# Patient Record
Sex: MALE | Race: ASIAN | NOT HISPANIC OR LATINO | Employment: UNEMPLOYED | URBAN - METROPOLITAN AREA
[De-identification: names, ages, dates, MRNs, and addresses within clinical notes are randomized per-mention and may not be internally consistent; named-entity substitution may affect disease eponyms.]

---

## 2021-04-27 ENCOUNTER — OFFICE VISIT - HEALTHEAST (OUTPATIENT)
Dept: ADDICTION MEDICINE | Facility: HOSPITAL | Age: 31
End: 2021-04-27

## 2021-04-27 DIAGNOSIS — F15.20 METHAMPHETAMINE USE DISORDER, MODERATE (H): ICD-10-CM

## 2021-04-29 ENCOUNTER — AMBULATORY - HEALTHEAST (OUTPATIENT)
Dept: ADDICTION MEDICINE | Facility: HOSPITAL | Age: 31
End: 2021-04-29

## 2021-04-29 DIAGNOSIS — F15.20 METHAMPHETAMINE USE DISORDER, SEVERE (H): ICD-10-CM

## 2021-04-30 ENCOUNTER — TRANSFERRED RECORDS (OUTPATIENT)
Dept: HEALTH INFORMATION MANAGEMENT | Facility: CLINIC | Age: 31
End: 2021-04-30

## 2021-04-30 ENCOUNTER — AMBULATORY - HEALTHEAST (OUTPATIENT)
Dept: LAB | Facility: HOSPITAL | Age: 31
End: 2021-04-30

## 2021-04-30 ENCOUNTER — AMBULATORY - HEALTHEAST (OUTPATIENT)
Dept: ADDICTION MEDICINE | Facility: HOSPITAL | Age: 31
End: 2021-04-30

## 2021-04-30 DIAGNOSIS — F15.20 METHAMPHETAMINE USE DISORDER, SEVERE (H): ICD-10-CM

## 2021-04-30 LAB
AMPHETAMINES UR QL SCN: ABNORMAL
BARBITURATES UR QL: ABNORMAL
BENZODIAZ UR QL: ABNORMAL
CANNABINOIDS UR QL SCN: ABNORMAL
COCAINE UR QL: ABNORMAL
CREAT UR-MCNC: 20.2 MG/DL
ETHANOL UR CFM-MCNC: <10 MG/DL
METHADONE UR QL SCN: ABNORMAL
OPIATES UR QL SCN: ABNORMAL
OXYCODONE UR QL: ABNORMAL
PCP UR QL SCN: ABNORMAL

## 2021-05-03 ENCOUNTER — OFFICE VISIT - HEALTHEAST (OUTPATIENT)
Dept: ADDICTION MEDICINE | Facility: HOSPITAL | Age: 31
End: 2021-05-03

## 2021-05-03 DIAGNOSIS — F15.20 METHAMPHETAMINE USE DISORDER, MODERATE (H): ICD-10-CM

## 2021-05-03 LAB
AMPHET UR-MCNC: 668 NG/ML
MDA UR-MCNC: <200 NG/ML
MDEA UR-MCNC: <200 NG/ML
MDMA UR-MCNC: <200 NG/ML
METHAMPHET UR-MCNC: 6894 NG/ML
PHENTERMINE UR CFM-MCNC: <200 NG/ML

## 2021-05-04 ENCOUNTER — OFFICE VISIT - HEALTHEAST (OUTPATIENT)
Dept: ADDICTION MEDICINE | Facility: HOSPITAL | Age: 31
End: 2021-05-04

## 2021-05-04 DIAGNOSIS — F15.20 METHAMPHETAMINE USE DISORDER, MODERATE (H): ICD-10-CM

## 2021-05-05 ENCOUNTER — COMMUNICATION - HEALTHEAST (OUTPATIENT)
Dept: ADDICTION MEDICINE | Facility: HOSPITAL | Age: 31
End: 2021-05-05

## 2021-05-06 ENCOUNTER — AMBULATORY - HEALTHEAST (OUTPATIENT)
Dept: ADDICTION MEDICINE | Facility: HOSPITAL | Age: 31
End: 2021-05-06

## 2021-05-07 ENCOUNTER — AMBULATORY - HEALTHEAST (OUTPATIENT)
Dept: ADDICTION MEDICINE | Facility: HOSPITAL | Age: 31
End: 2021-05-07

## 2021-05-11 ENCOUNTER — OFFICE VISIT - HEALTHEAST (OUTPATIENT)
Dept: ADDICTION MEDICINE | Facility: HOSPITAL | Age: 31
End: 2021-05-11

## 2021-05-11 DIAGNOSIS — F15.20 METHAMPHETAMINE USE DISORDER, MODERATE (H): ICD-10-CM

## 2021-05-13 ENCOUNTER — OFFICE VISIT - HEALTHEAST (OUTPATIENT)
Dept: ADDICTION MEDICINE | Facility: HOSPITAL | Age: 31
End: 2021-05-13

## 2021-05-13 DIAGNOSIS — F15.20 METHAMPHETAMINE USE DISORDER, MODERATE (H): ICD-10-CM

## 2021-05-14 ENCOUNTER — AMBULATORY - HEALTHEAST (OUTPATIENT)
Dept: LAB | Facility: HOSPITAL | Age: 31
End: 2021-05-14

## 2021-05-14 ENCOUNTER — AMBULATORY - HEALTHEAST (OUTPATIENT)
Dept: ADDICTION MEDICINE | Facility: HOSPITAL | Age: 31
End: 2021-05-14

## 2021-05-14 DIAGNOSIS — F15.20 METHAMPHETAMINE USE DISORDER, SEVERE (H): ICD-10-CM

## 2021-05-14 LAB
AMPHETAMINES UR QL SCN: ABNORMAL
BARBITURATES UR QL: ABNORMAL
BENZODIAZ UR QL: ABNORMAL
CANNABINOIDS UR QL SCN: ABNORMAL
COCAINE UR QL: ABNORMAL
CREAT UR-MCNC: 8.8 MG/DL
ETHANOL UR CFM-MCNC: <10 MG/DL
METHADONE UR QL SCN: ABNORMAL
OPIATES UR QL SCN: ABNORMAL
OXYCODONE UR QL: ABNORMAL
PCP UR QL SCN: ABNORMAL

## 2021-05-18 ENCOUNTER — AMBULATORY - HEALTHEAST (OUTPATIENT)
Dept: ADDICTION MEDICINE | Facility: HOSPITAL | Age: 31
End: 2021-05-18

## 2021-05-20 ENCOUNTER — OFFICE VISIT - HEALTHEAST (OUTPATIENT)
Dept: ADDICTION MEDICINE | Facility: HOSPITAL | Age: 31
End: 2021-05-20

## 2021-05-20 DIAGNOSIS — F15.20 METHAMPHETAMINE USE DISORDER, MODERATE (H): ICD-10-CM

## 2021-05-21 ENCOUNTER — AMBULATORY - HEALTHEAST (OUTPATIENT)
Dept: ADDICTION MEDICINE | Facility: HOSPITAL | Age: 31
End: 2021-05-21

## 2021-05-24 ENCOUNTER — AMBULATORY - HEALTHEAST (OUTPATIENT)
Dept: LAB | Facility: HOSPITAL | Age: 31
End: 2021-05-24

## 2021-05-24 DIAGNOSIS — F15.20 METHAMPHETAMINE USE DISORDER, SEVERE (H): ICD-10-CM

## 2021-05-24 LAB
AMPHETAMINES UR QL SCN: NORMAL
BARBITURATES UR QL: NORMAL
BENZODIAZ UR QL: NORMAL
CANNABINOIDS UR QL SCN: NORMAL
COCAINE UR QL: NORMAL
CREAT UR-MCNC: 16.3 MG/DL
ETHANOL UR CFM-MCNC: <10 MG/DL
METHADONE UR QL SCN: NORMAL
OPIATES UR QL SCN: NORMAL
OXYCODONE UR QL: NORMAL
PCP UR QL SCN: NORMAL

## 2021-05-25 ENCOUNTER — OFFICE VISIT - HEALTHEAST (OUTPATIENT)
Dept: ADDICTION MEDICINE | Facility: HOSPITAL | Age: 31
End: 2021-05-25

## 2021-05-25 DIAGNOSIS — F15.20 METHAMPHETAMINE USE DISORDER, MODERATE (H): ICD-10-CM

## 2021-05-27 ENCOUNTER — AMBULATORY - HEALTHEAST (OUTPATIENT)
Dept: ADDICTION MEDICINE | Facility: HOSPITAL | Age: 31
End: 2021-05-27

## 2021-05-28 ENCOUNTER — AMBULATORY - HEALTHEAST (OUTPATIENT)
Dept: ADDICTION MEDICINE | Facility: HOSPITAL | Age: 31
End: 2021-05-28

## 2021-06-03 ENCOUNTER — OFFICE VISIT - HEALTHEAST (OUTPATIENT)
Dept: ADDICTION MEDICINE | Facility: HOSPITAL | Age: 31
End: 2021-06-03

## 2021-06-03 DIAGNOSIS — F15.20 METHAMPHETAMINE USE DISORDER, MODERATE (H): ICD-10-CM

## 2021-06-04 ENCOUNTER — AMBULATORY - HEALTHEAST (OUTPATIENT)
Dept: ADDICTION MEDICINE | Facility: HOSPITAL | Age: 31
End: 2021-06-04

## 2021-06-08 ENCOUNTER — OFFICE VISIT - HEALTHEAST (OUTPATIENT)
Dept: ADDICTION MEDICINE | Facility: HOSPITAL | Age: 31
End: 2021-06-08

## 2021-06-08 DIAGNOSIS — F15.20 METHAMPHETAMINE USE DISORDER, MODERATE (H): ICD-10-CM

## 2021-06-10 ENCOUNTER — OFFICE VISIT - HEALTHEAST (OUTPATIENT)
Dept: ADDICTION MEDICINE | Facility: HOSPITAL | Age: 31
End: 2021-06-10

## 2021-06-10 DIAGNOSIS — F15.20 METHAMPHETAMINE USE DISORDER, MODERATE (H): ICD-10-CM

## 2021-06-11 ENCOUNTER — AMBULATORY - HEALTHEAST (OUTPATIENT)
Dept: ADDICTION MEDICINE | Facility: HOSPITAL | Age: 31
End: 2021-06-11

## 2021-06-15 ENCOUNTER — COMMUNICATION - HEALTHEAST (OUTPATIENT)
Dept: ADDICTION MEDICINE | Facility: HOSPITAL | Age: 31
End: 2021-06-15

## 2021-06-15 ENCOUNTER — OFFICE VISIT - HEALTHEAST (OUTPATIENT)
Dept: ADDICTION MEDICINE | Facility: HOSPITAL | Age: 31
End: 2021-06-15

## 2021-06-15 DIAGNOSIS — F15.20 METHAMPHETAMINE USE DISORDER, MODERATE (H): ICD-10-CM

## 2021-06-17 NOTE — PROGRESS NOTES
Telemedicine Visit: The patient's condition can be safely assessed and treated via synchronous audio and visual telemedicine encounter.      Reason for Telemedicine Visit: Services only offered via telehealth.    Originating Site (Patient Location): Patient's home.    Distant Site (Provider Location): [facility] Outpatient Setting: [facility].    Consent:  The patient/guardian has verbally consented to: the potential risks and benefits of telemedicine (video visit) versus in person care; bill my insurance or make self-payment for services provided; and responsibility for payment of non-covered services.     Mode of Communication:  Video Conference via Zoom.    Call Started at: 1:00 PM  Call Ended at: 3: 00 PM    As the provider I attest to compliance with applicable laws and regulations related to telemedicine.    Jayesh Mcconnell attended 2 hours of group today.     The group topic was  What Does a Healthy Family Look Like?  Patient was responsive to topic.     Patient's engagement in the group session: medium.    Total group size: 7    LE Patel [facility]  5/4/2021, 3:05 PM.

## 2021-06-17 NOTE — PROGRESS NOTES
ABSENT NOTE:    Jayesh Mcconnell was absent from group 5/18/2021 . This absence was not excused. This writer attempted to contact patient via phone. Patient is expected to be in group on 05/20/21.     LE Espinal, [facility]  5/18/2021 , 2:35 PM

## 2021-06-17 NOTE — PROGRESS NOTES
Weekly Progress Note 05/10/2021 - 5/14/2021  Ko, Ko  1990  [id number]      D) Pt attended 2/2 groups this week with 1 unexcused absence. Patient attended 0 individual sessions this week. A) Staff facilitated groups and reviewed tx progress. [facility] for [facility]. R) No VAP needed at this time.   Any significant events, defines as events that impact patients relationship with others inside and outside of treatment: No  Indicate any changes or monitoring of physical or mental health problems: None    Indicate involvement by any outside supports: None  IAPP reviewed and modified as needed. NA  Pt working on the following dimensions:  Dimension #1 - Withdrawal Potential - Risk 0. Client reports last date of use as 04/27/2021. No signs or symptoms of intoxication or withdrawal noted or reported.   Specific goals from treatment plan addressed this week:  Client to maintain abstinence throughout outpatient treatment.   Effectiveness of strategies:  Progressing: No Changes Noted.     Dimension #2 - Biomedical - Risk 0. The client reports he has no medical needs unmet and can obtain any necessary medical care on his own.    Specific goals from treatment plan addressed this week:  Client to maintain stable health throughout outpatient treatment.  Effectiveness of strategies:  Progressing: Client reports no new issues in this area this week.     Dimension #3 - Emotional/Behavioral/Cognitive - Risk 0 No emotional or mental health problems noted or reported.  Specific goals from treatment plan addressed this week:  N.A  Effectiveness of strategies:  N/A     Dimension #4 - Treatment Acceptance/Resistance - Risk 2. The Client may lack insight into his substance use problems due in part to cultural barriers that interfere with his understanding of the need for treatment.   Specific goals from treatment plan addressed this week:  Client will increase motivation towards recovery by participating in outpatient programming.  "  Effectiveness of strategies:  Progressing: Client attended 2/2 groups via tele-health application this week and participated in group discussion with prompting. Client stated during check in that he is sober because his family doesn't want him to use and that he was grateful to be able to live with his sister.     Dimension #5 - Relapse Potential - Risk 3. The client did not demonstrate any awareness of situations or emotions that trigger his drinking or display knowledge or understanding of the skills or coping mechanisms necessary to avoid those triggers.   Specific goals from treatment plan addressed this week:  Client will gain understanding and insight into situations and emotions that trigger his substance use.  Effectiveness of strategies:  Progressing: Client provided a UA sample on 05/14/2021 that was positive for methamphetamine, but quantitative results were not available at the time of this writing. Quantitative results for the sample provided by the client on 4/30/21 were 668 ng/ml. Client denied any use during check-in this week.     Dimension #6 - Recovery Environment - Risk 3. Client is not currently attending any community based recovery support groups and did not identify any reliable means of recovery support.  Specific goals from treatment plan addressed this week:  Client will explore and identify healthy sober supports in his community.  Effectiveness of strategies:  Progressing: Client did not attend the [facility] Community Support group last Saturday. Client was informed that the meeting has resumed with social distancing and precautions and encouraged to attend.      T) Treatment plan updated: No. Client notified and in agreement: NA.  Client educated on \"Being Part of Our Group  and  Anger Management . Client has completed 9 of 80 program hours at this time. Projected discharge date is 10/13/2021. Current discharge plan is not yet developed.   Kaden [facility]  5/14/2021, 12:04 " PM

## 2021-06-17 NOTE — PROGRESS NOTES
Outpatient Substance Use Disorder Treatment - Initial Services Plan     Patient  Name: Jayesh Mcconnell  MRN: [id number]   : 1990  Admit Date: 2021  Client describes their immediate need:  To learn recovery skills to prevent relapse. Comply with Probation. Find a Job.    Are there any immediate Safety Needs such as (physical, stability, mobility): Client states he can get medical care as needed and denies any immediate concerns.     Immediate Health Needs and Plan:   Remain abstinent from substance use and attend first group therapy session when scheduled.    Vulnerable Adult: No     Issues to be addressed in the first sessions:   Treatment planning, orientation to group norms and rules, and welcomed by peers.     Client strengths and needs:   Strengths identified as: I like to work.  Needs identified as: ESL classes, Service Coordination, monitor insurance.    Plan for patient for time between intake and completion of the treatment plan:   Attend all group therapy sessions as directed, complete all written and oral assignments as directed, and remain abstinent from all mood-altering substances. You must report any lapse in abstinence to treatment staff immediately in order to ensure you are receiving the proper level of care. If you cannot attend or participate in a group therapy session, you must call contact information provided in intake folder and leave a message before or during group hours.     Vulnerable Adult Review   [X] Review of the Facility Abuse Prevention plan was reviewed with the patient   [X] No Individual Abuse Plan is necessary   [ ] In addition to the Facility Abuse Prevention plan, an Individual Abuse Plan will be put in place     Staff Name/Title: LE Espinal, [facility]  Date: 2021 Time: 10:49 AM

## 2021-06-17 NOTE — PROGRESS NOTES
Telemedicine Visit: The patient's condition can be safely assessed and treated via synchronous audio and visual telemedicine encounter.      Reason for Telemedicine Visit: Services only offered via telehealth.    Originating Site (Patient Location): Patient's home.    Distant Site (Provider Location): [facility] Outpatient Setting: [facility].    Consent:  The patient/guardian has verbally consented to: the potential risks and benefits of telemedicine (video visit) versus in person care; bill my insurance or make self-payment for services provided; and responsibility for payment of non-covered services.     Mode of Communication:  Video Conference via Zoom.    Call Started at: 9:30 AM  Call Ended at: 11:30 AM    As the provider I attest to compliance with applicable laws and regulations related to telemedicine.    Jayesh Mcconnell attended 2 hours of group today.     The group topic was  Group Rules and Norms and Treatment Orientation  Patient was responsive to topic.     Patient's engagement in the group session: medium.    Total group size: 3    LE Patel [facility]  5/3/2021, 1:32 PM.

## 2021-06-17 NOTE — PROGRESS NOTES
Individual Treatment Plan    Patient  Name: Jayesh Mcconnell  MRN: [id number]   : 1990  Admit Date: 2021  Date of Initial Service Plan: 2021  Tentative Discharge Date: 10/13/2021    Diagnosis: Stimulant Related Disorder, Amphetamine type substance (F15.20) (304.40).  Counselor: LE Espinal LADC      Dimension 1: Acute Intoxication/Withdrawal Potential, Risk level: 1    Problem Statement from Comprehensive Assessment:  Client states his last date of use as 2020 but provided a UA sample for his  in 2021 that was positive for Methamphetamine. Client did not appear to act a reliable historian for his use. No signs or symptoms of intoxication or withdrawal noted or reported.  Problem: No signs or symptoms of intoxication or withdrawal noted or reported.   Goal: Patient to maintain abstinence throughout outpatient treatment.   Must be reached to complete treatment? Yes  Methods/Strategies (must include amount and frequency):   1. Patient to report any substance/alcohol use to counselor to determine if any changes need to be made to address withdrawal symptoms.   2. Patient to complete any requested UAs.   Target Date: 10/13/2021  Completion Date:       Dimension 2: Biomedical Conditions/Complications, Risk level: 0    Problem Statement from Comprehensive Assessment:  The client reports he has no medical needs unmet and can obtain any necessary medical care on his own.    Problem: N/A  Goal: Patient to maintain stable health throughout outpatient treatment.   Must be reached to complete treatment? No  Methods/Strategies (must include amount and frequency):   1. Patient to report any changes to physical health to counselor.   2. Patient to attend all scheduled doctor s appointments.   3. Patient to take medications as prescribed.   Target Date: 10/13/2021  Completion Date:       Dimension 3: Emotional/Behavioral/Cognitive, Risk level: 0    Problem Statement from Comprehensive  Assessment:  No emotional or mental health problems noted or reported.  Problem: N/A  Goal: No Plan Needed at this time.   Must be reached to complete treatment? N/A  Methods/Strategies (must include amount and frequency): N/A  Target Date: N/A  Completion Date: N/A      Dimension 4: Readiness to Change, Risk level 2    Problem Statement from Comprehensive Assessment:  Client does not recognize that his substance use could be causing problems for him but states he is willing to attend treatment and follow recommendations. Client provided information during his assessment interview that was markedly different from information obtained from collateral sources. The Client appears to lack insight into his substance use problems due in part to cultural barriers that interfere with his understanding of the need for treatment. Client has external motivation for treatment in the form of parole for a previous conviction involving substance possession.  Problem: The Client appears to lack insight into his substance use problems due in part to cultural barriers that interfere with his understanding of the need for treatment.  Goal: Patient to increase motivation towards recovery by participating in outpatient programming.   Must be reached to complete treatment? Yes  Methods/Strategies (must include amount and frequency):   1. Patient to attend 2, 3-hour groups per week and all scheduled 1:1s.  2. Patient will contact staff before start time if unable to attend any scheduled group or appointment  Target Date: 10/13/2021  Completion Date:       Dimension 5: Relapse/Continued Use/Continued Problem Potential, Risk level: 3    Problem Statement from Comprehensive Assessment:  The client did not demonstrate any awareness of situations or emotions that trigger his substance use or display any knowledge or understanding of the skills or coping mechanisms necessary to avoid those triggers.   Problem: The client did not demonstrate any  awareness of situations or emotions that trigger his drinking or display knowledge or understanding of the skills or coping mechanisms necessary to avoid those triggers.  Goal: To gain understanding of relapse triggers and stressors while learning coping skills in order to handle life events without resorting to substance use.  Must be reached to complete treatment? Yes  Methods/Strategies (must include amount and frequency):   Patient to share in daily check-in any urges and addictive thinking to better understand his pattern of use and to prevent relapse in the future.   Target Date: 10/13/2021  Completion Date:       Dimension 6: Recovery Environment, Risk level: 3    Problem Statement from Comprehensive Assessment:  Client is not currently employed and lives with his sister and her children but is not currently involved in any structured educational or volunteer activities. Client is not currently attending any community-based recovery support groups and did not identify any reliable sources of recovery support. Client is on parole for a previous offense involving substance possession and has a recent arrest for possession of Methamphetamine.  Problem: Client is not currently attending any community-based recovery support groups and did not identify any reliable means of recovery support.  Goal: Client will explore and identify healthy recovery supports in his community.  Must be reached to complete treatment? Yes  Methods/Strategies (must include amount and frequency): Client will attend 1 [facility] Community Support Group meeting per week (as able and available) and report back to counselor and group on his experiences.  Target Date: 10/13/2021  Completion Date:       Resources  Resources to which the patient is being referred for problems when problems are to be addressed concurrently by another provider: None  By signing this document, I am acknowledging that I was actively and directly involved in the  development of my treatment plan.    Patient  Signature_________________________________________         Date__________________     Staff Signature  JOSE Patel    Date: 4/30/2021, 9:48 AM.

## 2021-06-17 NOTE — PROGRESS NOTES
Weekly Progress Note 05/17/2021 - 5/21/2021  Ko, Ko  1990  [id number]      D) Pt attended 1/2 groups this week with 1 unexcused absence. Patient attended 0 individual sessions this week. A) Staff facilitated groups and reviewed tx progress. Ass[facility] for [facility]. R) No VAP needed at this time.   Any significant events, defines as events that impact patients relationship with others inside and outside of treatment: No  Indicate any changes or monitoring of physical or mental health problems: None    Indicate involvement by any outside supports: None  IAPP reviewed and modified as needed. NA  Pt working on the following dimensions:  Dimension #1 - Withdrawal Potential - Risk 0. Client reports last date of use as 04/27/2021. No signs or symptoms of intoxication or withdrawal noted or reported.   Specific goals from treatment plan addressed this week:  Client to maintain abstinence throughout outpatient treatment.   Effectiveness of strategies:  Progressing: No Changes Noted.     Dimension #2 - Biomedical - Risk 0. The client reports he has no medical needs unmet and can obtain any necessary medical care on his own.    Specific goals from treatment plan addressed this week:  Client to maintain stable health throughout outpatient treatment.  Effectiveness of strategies:  Progressing: Client reports no new issues in this area this week.     Dimension #3 - Emotional/Behavioral/Cognitive - Risk 0 No emotional or mental health problems noted or reported.  Specific goals from treatment plan addressed this week:  N.A  Effectiveness of strategies:  N/A     Dimension #4 - Treatment Acceptance/Resistance - Risk 2. The Client may lack insight into his substance use problems due in part to cultural barriers that interfere with his understanding of the need for treatment.   Specific goals from treatment plan addressed this week:  Client will increase motivation towards recovery by participating in outpatient programming.  "  Effectiveness of strategies:  Progressing: Client attended 1/2 groups via tele-health application this week and participated in group discussion with prompting. Client stated during check in that he is sober because using is not a healthy habit and that he was grateful to have help with his problems.     Dimension #5 - Relapse Potential - Risk 3. The client did not demonstrate any awareness of situations or emotions that trigger his drinking or display knowledge or understanding of the skills or coping mechanisms necessary to avoid those triggers.   Specific goals from treatment plan addressed this week:  Client will gain understanding and insight into situations and emotions that trigger his substance use.  Effectiveness of strategies:  Progressing: Client denied any use during check-in this week.     Dimension #6 - Recovery Environment - Risk 3. Client is not currently attending any community based recovery support groups and did not identify any reliable means of recovery support.  Specific goals from treatment plan addressed this week:  Client will explore and identify healthy sober supports in his community.  Effectiveness of strategies:  Progressing: Client did not attend the [facility] Community Support group last Saturday.     T) Treatment plan updated: No. Client notified and in agreement: NA.  Client educated on \"Rebuilding a Healthy Family  and  Rebuilding a Healthy Family Pt 2 . Client has completed 11 of 80 program hours at this time. Projected discharge date is 10/13/2021. Current discharge plan is not yet developed.   JOSE Patel  5/21/2021, 1:46 PM  " [Follow - Up] : a follow-up visit [PCOS] : PCOS

## 2021-06-17 NOTE — PROGRESS NOTES
Weekly Progress Note 05/03/2021 - 5/7/2021  Ko, Ko  1990  [id number]      D) Pt attended 2/3 groups this week with 1 unexcused absence. Patient attended 0 individual sessions this week. A) Staff facilitated groups and reviewed tx progress. Assessed for [facility]. R) No VAP needed at this time.   Any significant events, defines as events that impact patients relationship with others inside and outside of treatment: No  Indicate any changes or monitoring of physical or mental health problems: None    Indicate involvement by any outside supports: None  IAPP reviewed and modified as needed. NA  Pt working on the following dimensions:  Dimension #1 - Withdrawal Potential - Risk 0. Client reports last date of use as 04/27/2021. No signs or symptoms of intoxication or withdrawal noted or reported.   Specific goals from treatment plan addressed this week:  Client to maintain abstinence throughout outpatient treatment.   Effectiveness of strategies:  Progressing: Client reports last date of use as 04/27/2021. Client was reminded of the expectation that he will maintain abstinence throughout treatment and agreed to comply.     Dimension #2 - Biomedical - Risk 0. The client reports he has no medical needs unmet and can obtain any necessary medical care on his own.    Specific goals from treatment plan addressed this week:  Client to maintain stable health throughout outpatient treatment.  Effectiveness of strategies:  Progressing: Client reports no new issues in this area this week.     Dimension #3 - Emotional/Behavioral/Cognitive - Risk 0 No emotional or mental health problems noted or reported.  Specific goals from treatment plan addressed this week:  N.A  Effectiveness of strategies:  N/A     Dimension #4 - Treatment Acceptance/Resistance - Risk 2. The Client may lack insight into his substance use problems due in part to cultural barriers that interfere with his understanding of the need for treatment.   Specific goals from  "treatment plan addressed this week:  Client will increase motivation towards recovery by participating in outpatient programming.   Effectiveness of strategies:  Not Progressing: Client attended 2/3 groups via tele-health application this week and participated in group discussion with prompting. Client had 1 unexcused absence from group and did not call to report the reason for his absence. Client stated during check in that he is sober because he wants a better future and that he was grateful to be in group.     Dimension #5 - Relapse Potential - Risk 3. The client did not demonstrate any awareness of situations or emotions that trigger his drinking or display knowledge or understanding of the skills or coping mechanisms necessary to avoid those triggers.   Specific goals from treatment plan addressed this week:  Client will gain understanding and insight into situations and emotions that trigger his substance use.  Effectiveness of strategies:  Not Progressing: Client provided a UA sample on 04/30/2021 that was positive for methamphetamine. Client stated during check-in that he had experienced no thoughts or cravings to drink this week.     Dimension #6 - Recovery Environment - Risk 3. Client is not currently attending any community based recovery support groups and did not identify any reliable means of recovery support.  Specific goals from treatment plan addressed this week:  Client will explore and identify healthy sober supports in his community.  Effectiveness of strategies:  Progressing: Client did not attend the [facility] Community Support group last Saturday because he was not familiar with it. Client was given resources and information regarding the location and time of the meeting. Client shared his history with peers.      T) Treatment plan updated: No. Client notified and in agreement: NA.  Client educated on \"Group Rules and Norms and Treatment Orientation  and  What Does a Healthy Family Look " Like? . Client has completed 5 of 80 program hours at this time. Projected discharge date is 10/13/2021. Current discharge plan is not yet developed.   JSOE Patel  5/7/2021, 11:48 AM

## 2021-06-17 NOTE — PROGRESS NOTES
Telemedicine Visit: The patient's condition can be safely assessed and treated via synchronous audio and visual telemedicine encounter.    Reason for Telemedicine Visit: Services only offered telehealth  Originating Site (Patient Location): Patient's home  Distant Site (Provider Location): [facility] Outpatient Setting: [facility]  Consent:  The patient/guardian has verbally consented to: the potential risks and benefits of telemedicine (video visit) versus in person care; bill my insurance or make self-payment for services provided; and responsibility for payment of non-covered services.   Mode of Communication:  Video Conference via Zoom  Call Started at: 9:30 AM  Call Ended at: 10:30 AM  As the provider I attest to compliance with applicable laws and regulations related to telemedicine.    Substance Use Disorder Outpatient Treatment  Intake Note:     D) Jayesh Mcconnell is a 31 y.o.  single  male who is referred to [facility]  via [facility] with funding from [facility]. Patient orientated x 3. Patient meets criteria for Stimulant Use Disorder, moderate, amphetamine type substance (F15.20).  Patient appears appropriate for OUTPATIENT TREATMENT.   A) Met with patient for 60 minutes on 04/27/2021.  Completed intake assessment and preliminary paperwork. Patient was given and explained counselor & supervisor license number and contact info, Patient Bill of Rights, program rules/regulations, Program Abuse Prevention Plan, confidentiality & HIPPA policies, grievance procedure, presented ROIs, TB & HIV/AIDS policies & resources, and Vulnerable Adult policy.   Conducted Vulnerable Adult Assessment.   R)No special Vulnerable Adult needed at this time.  Patient signed and agreed to counselor & supervisor license number and contact info, Patient Bill of Rights, group rules/regulations, Program Abuse Prevention Plan, confidentiality & HIPPA policies, grievance  "procedure,  ROIs, TB & HIV/AIDS policies & resources, and Vulnerable Adult policy. Patient scored NO RISK on C-SSRS screen. Patient denied suicidal ideation/intent/plan/means at this time.     Opioid Use Disorder: No   Provided \"Options for Opioid Treatment in [place] and Overdose Prevention\" No     Dimension #1:  - Withdrawal Potential - Risk 0 - Client states his last date of use as 7/12/2020 but provided a UA sample for his  in March 2021 that was positive for Methamphetamine. Client did not appear to act a reliable historian for his use. No signs or symptoms of intoxication or withdrawal noted or reported.  Dimension #2 - Biomedical - Risk 0 - The client reports he has no medical needs unmet and is able to obtain any necessary care on his own.  Dimension #3 - Emotional, Behavioral and Cognitive - Risk 0 - No emotional or mental health problems noted or reported.  Dimension #4 - Readiness for Change - Risk 2 - Client does not recognize that his substance use could be causing problems for him but states he is willing to attend treatment and follow recommendations. Client provided information during his assessment interview that was markedly different from information obtained from collateral sources. The Client appears to lack insight into his substance use problems due in part to cultural barriers that interfere with his understanding of the need for treatment. Client has external motivation for treatment in the form of parole for a previous conviction involving substance possession.  Dimension #5 - Relapse Potential - Risk 3 - The client did not demonstrate any awareness of situations or emotions that trigger his drinking or display knowledge or understanding of the skills or coping mechanisms necessary to avoid those triggers.   Dimension #6 - Recovery Environment - Risk 3 - Client is not currently employed and lives with his sister and her children but is not currently involved in any " structured educational or volunteer activities. Client is not currently attending any community-based recovery support groups and did not identify any reliable sources of recovery support. Client is on parole for a previous offense involving substance possession and has a recent arrest for possession of Methamphetamine.    T) Explained counselor & supervisor license number and contact info, Patient Bill of Rights, program rules/regulations, Program Abuse Prevention Plan, confidentiality & HIPPA policies, grievance procedure, presented ROIs, TB & HIV/AIDS policies & resources, and Vulnerable Adult policy. Patient expected to start group on 05/03/2021.    LE Espinal, [facility]  4/27/2021, 12:17 PM

## 2021-06-17 NOTE — PROGRESS NOTES
Telemedicine Visit: The patient's condition can be safely assessed and treated via synchronous audio and visual telemedicine encounter.      Reason for Telemedicine Visit: Services only offered via telehealth.    Originating Site (Patient Location): Patient's home.    Distant Site (Provider Location): [facility] Outpatient Setting: [facility].    Consent:  The patient/guardian has verbally consented to: the potential risks and benefits of telemedicine (video visit) versus in person care; bill my insurance or make self-payment for services provided; and responsibility for payment of non-covered services.     Mode of Communication:  Video Conference via Zoom.    Call Started at: 1:00 PM  Call Ended at: 2:40 PM    As the provider I attest to compliance with applicable laws and regulations related to telemedicine.    Jayesh Mcconnell attended 2 hours of group today.     The group topic was  Anger Management  Patient was responsive to topic.     Patient's engagement in the group session: medium.    Total group size: 7    LE Patel [facility]  5/13/2021, 2:59 PM.

## 2021-06-17 NOTE — PROGRESS NOTES
"Substance Use Disorder Treatment  Comprehensive Assessment   Date: 2021         : LE Espinal LADC    Name: Jayesh Mcconnell  Address: [address] [place] [address]  Phone: [phone] (home)   Referral Source: [facility]  : 1990  Age: 31 y.o.  Race/Ethnicity:   Marital Status: single  Employment: Not Currently Employed  Level of Education: None Socio-economic (yearly Income) Status: N/A  Sexual Orientation: identifies as a heterosexual Last 4 digits of Social Security: 7306    Is assistance required in the ability to read and understand written material?   yes -     Reason for seeking services: \"I was in alf because I missed a court date. I was drinking and driving a friend's car, but the car was stolen and I was arrested.\"  Client was referred to treatment after his  required him to get an assessment. Client was arrested for 5th degree possession of Methamphetamine after being paroled, and gave a UA that was positive for Meth in .    Dimension I Acute intoxication/Withdrawal Potential:    Symptomology (past 12 months, check all that apply)  repeated family or social problems    Observed or reported (withdrawal symptoms, check all that apply)  none reported or displayed    Chemical use history (client self-report, throughout lifetime)  Primary Drug Used  Age of First Use  Most Recent Pattern of Use and Duration    Date of last use  Time (if substance use in the last 30 days) WithdrawalPotential? Requiring special care  Method of use   (oral, smoked, snort, IV, etc)    Alcohol  26 1 -2x per month, 4 or 5 beers each time. 22  No Oral   Marijuana/Hashish   Denies       Cocaine/Crack   Denies       Meth/Amphetamines  27 2x month, with friends, 2 - 3 hits on the pipe each time. 2018  No Smoke   Heroin   None       Other Opiates/Synthetics   None       Inhalants   None       Benzodiazepines   None     "   Hallucinogens   None       Barbiturates/Sedatives/Hypnotics  None       Over-the-Counter Drugs   None       Other Betel Nut 13 Not often-when I saw other people chewing it. 2021  No Oral   Nicotine   None         Dimension I Risk Ratin Reason Risk Rating Assigned: Client states his last date of use as 2020, but provided a UA sample for his  in 2021 that was positive for Methamphetamine. Client did not appear to act a reliable historian for his use. No signs or symptoms of intoxication or withdrawal noted or reported.      Dimension II Biomedical Conditions:  Any known health conditions: No  Ever previously treated/diagnosed with any eating disorder?  no   List Health Concerns/Conditions Reported: None  Does patient indicate awareness of any association between substance use and listed health concerns/conditions? No  Physical/Health Conditions which are associated with substance use: None  Are Health Concerns/Conditions being treated? N/A  By Whom? N/A    Patient Self-Reported Medications:  Medication Dosage Frequency   None            Are you pregnant: NA OB care received:NA CPS call needed: NA    Dimension II Risk Ratin  Reason Risk Rating Assigned: The client reports he has no medical needs unmet and can obtain any necessary medical care on his own.      Dimension III Emotional/Behavioral/Cognitive:    Oriented to person, place, time, situation?  Yes     When was the last time that you had significant problems   A. With feeling very trapped, lonely, sad, blue, depressed or hopeless about the future?   Never  B. With sleep trouble, such as bad dreams, sleeping restlessly, or falling asleep during the day?   Never  C. With feeling very anxious, nervous, tense, scared, panicked, or like something bad was going to happen?   Never  D. With becoming very distressed and upset when something reminded you of the past?   Never  E. With thinking about ending your life or committing  suicide?    Never    When was the last time that you did the following things two or more times?  A. Lied or conned to get things you wanted or to avoid having to do something?   Never  B. Had a hard time paying attention at school, work, or home?   Never  C. Had a hard time listening to instructions at school, work, or home?   Never  D. Were a bully or threatened other people?   Never  E. Started physical fights with other people?   Never  Note: These questions are from the Global Appraisal of Individual Needs--Short Screener. Any item marked  past month  or  2 to 12 months ago  will be scored with a severity rating of at least 2.  For each item that has occurred in the past month or past year ask follow up questions to determine how often the person has felt this way or has the behavior occurred? How recently? How has it affected their daily living? And, whether they were using or in withdrawal at the time?  See Above.    Current Mental Health Services: no  History of Mental Health services: no  Past Hospitalization for MH or psychiatric problems: No  How many Hospitalizations: 0   Last Hospitalization; date and location: N/A      Past or Current Issues with Gambling (Explain): no  Prior Treatment for Gambling: No     MH Diagnoses: N/A Psychiatrist: N/A  Clinic: N/A   Current Psychotropic Medications:  None  Taking medications as prescribed:  N/A   Medications Helpful: NA    Current Suicidal Ideation: No  If yes, any plan? NA What is plan?: N/A  Previous Suicide Attempts?  No   Explain: N/A   Current Homicidal Ideation: No  If yes, any plan? NA What is plan?: N/A    Previous Homicide Attempts? No Explain: N/A  Suicidal/Homicidal Ideation in last 30 days? No  Explain: N/A   Hazardous behavior engaged in which placed self or others in danger (i.e., exposure blood-borne pathogens/STIs, unsafe sex, sharing needles, etc.)? None    Family history of substance and/or mental health diagnosis/issues?  No  Explain:  N/A  History of abuse (Physical, Emotional, Sexual)? No  Explain: N/A    Dimension III Risk Ratin  Reason Risk Rating Assigned: No emotional or mental health problems noted or reported.      Dimension IV Readiness to Change:  Mandated, or coerced into assessment or treatment:  Yes  Does client feel there is a problem:   No  Verbalization of need/desire to change:   Yes   Willing to follow treatment recommendations: Yes   Impression of : (Check all that apply): ambivalent about change, minimal awareness, low motivation and minimally cooperative  Are there any spiritual, cultural, or other special needs to be addressed for client to be successful in treatment? no    Dimension IV Risk Ratin Reason Risk Rating Assigned: Client does not recognize that his substance use could be causing problems for him but states he is willing to attend treatment and follow recommendations. Client provided information during his assessment interview that was markedly different from information obtained from collateral sources. The Client appears to lack insight into his substance use problems due in part to cultural barriers that interfere with his understanding of the need for treatment. Client has external motivation for treatment in the form of parole for a previous conviction involving substance possession.      Dimension V Relapse/Continued Use/Continued Problem Potential   Client age at First Treatment: N/A  Lifetime # of CD Treatments:  0  List program, dates, and status of completion (within last five years): N/A  Longest Period of Abstinence: 9 Months  How did you accomplish this? Stay home, relax.   Circumstances which led to Relapse: N/A    Risk Taking/Problem Behaviors Related to Use and/or Under the Influence: None    Dimension V Risk Rating: 3  Reason Risk Rating Assigned: The client did not demonstrate any awareness of situations or emotions that trigger his substance use or display knowledge or  understanding of the skills or coping mechanisms necessary to avoid those triggers.      Dimension VI Recovery Environment   Family support:  Yes  Peer Sober Support:  No  Current living circumstances:  Living with my sister and her children.  Environment supportive of recovery:  Yes    Specific activities participating in which do not involve substance use: Take walks in the park, get some exercise.  Specific activities participating in which do involve substance use: Just sitting around and talking with friends.  People, things that threaten recovery: no  Desired family involvement in treatment services: no  Current legal involvement:  [facility]  Lifetime legal consequences related to use: 2 Arrests, probation violation, recent arrest for Meth possession.  Current CPS involvement: None  Consequences or effects of use on academic/professional performance: Denies  Current ability to function in a work and/or education setting: No Impairments Noted  Current support network for recovery (including community-based recovery support): None  Do you belong to a [place]: No Which [place]? N/A Reside on reservation: No     Dimension VI Risk Rating: 3 Reason Risk Rating Assigned: Client is not currently employed and lives with his sister and her children but is not currently involved in any structured educational or volunteer activities. Client is not currently attending any community-based recovery support groups and did not identify any reliable sources of recovery support. Client is on parole for a previous offense involving substance possession and has a recent arrest for possession of Methamphetamine.    DSM-V Criteria for Substance Abuse  Instructions:  Determine whether the client currently meets the criteria for a Substance Use Disorder using the diagnostic criteria in the  DSM-V, pp. 481-589. Current means during the most recent 12 months outside a facility that controls access to substances.    Category of  substance Severity ICD-10 Code/DSM V Code  Alcohol Use Disorder Mild  Moderate  Severe (F10.10) (305.00)  (F10.20) (303.90)  (F10.20) (303.90)   Cannabis Use Disorder Mild  Moderate  Severe (F12.10) (305.20)  (F12.20) (304.30)  (F12.20) (304.30)   Hallucinogen Use Disorder Mild  Moderate  Severe (F16.10) (305.30)  (F16.20) (304.50)  (F16.20) (304.50)   Inhalant Use Disorder Mild  Moderate  Severe (F18.10) (305.90)  (F18.20) (304.60)  (F18.20) (304.60)   Opioid Use Disorder Mild  Moderate  Severe (F11.10) (305.50)  (F11.20) (304.00)  (F11.20) (304.00)   Sedative, Hypnotic, or Anxiolytic Use Disorder Mild  Moderate  Severe (F13.10) (305.40)  (F13.20) (304.10)  (F13.20) (304.10)   Stimulant Related Disorders Mild              Moderate              Severe   (F15.10) (305.70) Amphetamine type substance  (F14.10) (305.60) Cocaine  (F15.10) (305.70) Other or unspecified stimulant    (F15.20) (304.40) Amphetamine type substance  (F14.20) (304.20) Cocaine  (F15.20) (304.40) Other or unspecified stimulant    (F15.20) (304.40) Amphetamine type substance  (F14.20) (304.20) Cocaine  (F15.20) (304.40) Other or unspecified stimulant   DisorderTobacco use Disorder Mild  Moderate  Severe (Z72.0) (305.1)  (F17.200) (305.1)  (F17.200) (305.1)   Other (or unknown) Substance Use Disorder Mild  Moderate  Severe (F19.10) (305.90)  (F19.20) (304.90)  (F19.20) (304.90)     Diagnostic Impression: Stimulant Use Disorder, moderate, amphetamine type substance (F15.20)    Assessment Completed Within 3 Sessions of Admission: Yes  If NO, date assessment to be completed noted in Treatment Plan: N/A    Signature of Counselor: LE Espinal [facility]  Date and Time of Signature: 4/27/2021, 12:17 PM

## 2021-06-17 NOTE — PROGRESS NOTES
Telemedicine Visit: The patient's condition can be safely assessed and treated via synchronous audio and visual telemedicine encounter.      Reason for Telemedicine Visit: Services only offered via telehealth.    Originating Site (Patient Location): Patient's home.    Distant Site (Provider Location): [facility] Outpatient Setting: [facility].    Consent:  The patient/guardian has verbally consented to: the potential risks and benefits of telemedicine (video visit) versus in person care; bill my insurance or make self-payment for services provided; and responsibility for payment of non-covered services.     Mode of Communication:  Video Conference via Zoom.    Call Started at: 1:00 PM  Call Ended at: 2:50 PM    As the provider I attest to compliance with applicable laws and regulations related to telemedicine.    Jayesh Mcconnell attended 2 hours of group today.     The group topic was  Rebuilding a Healthy Family Pt 2  Patient was responsive to topic.     Patient's engagement in the group session: medium.    Total group size: 7    LE Patel [facility]  5/20/2021, 3:30 PM.

## 2021-06-17 NOTE — TELEPHONE ENCOUNTER
D: Returned call to client's  in response to a voicemail left on 05/06/2021 requesting an update on the client's treatment status.  A: Left voicemail reporting client's current attendance record and most recent UA results.  T: Client is expected to be in group on 05/08/2021.     LE Espinal, [facility]  5/5/2021 , 10:17 AM

## 2021-06-17 NOTE — PROGRESS NOTES
Telemedicine Visit: The patient's condition can be safely assessed and treated via synchronous audio and visual telemedicine encounter.      Reason for Telemedicine Visit: Services only offered via telehealth.    Originating Site (Patient Location): Patient's home.    Distant Site (Provider Location): [facility] Outpatient Setting: [facility].    Consent:  The patient/guardian has verbally consented to: the potential risks and benefits of telemedicine (video visit) versus in person care; bill my insurance or make self-payment for services provided; and responsibility for payment of non-covered services.     Mode of Communication:  Video Conference via Zoom.    Call Started at: 1:00 PM  Call Ended at: 3:00 PM    As the provider I attest to compliance with applicable laws and regulations related to telemedicine.    Jayesh Mcconnell attended 2 hours of group today.     The group topic was  Being Part of Our Group  Patient was responsive to topic.     Patient's engagement in the group session: medium.    Total group size: 7    LE Patel [facility]  5/11/2021, 3:08 PM.

## 2021-06-17 NOTE — PROGRESS NOTES
ABSENT NOTE:    Jayesh Mcconnell was absent from group 5/6/2021 . This absence was not excused. This writer attempted to contact patient via telephone. Patient is expected to be in group on 05/11/2021.     LE Espinal, [facility]  5/6/2021 , 3:03 PM

## 2021-06-18 ENCOUNTER — AMBULATORY - HEALTHEAST (OUTPATIENT)
Dept: ADDICTION MEDICINE | Facility: HOSPITAL | Age: 31
End: 2021-06-18

## 2021-06-21 ENCOUNTER — COMMUNICATION - HEALTHEAST (OUTPATIENT)
Dept: ADDICTION MEDICINE | Facility: HOSPITAL | Age: 31
End: 2021-06-21

## 2021-06-21 ENCOUNTER — AMBULATORY - HEALTHEAST (OUTPATIENT)
Dept: LAB | Facility: HOSPITAL | Age: 31
End: 2021-06-21

## 2021-06-21 DIAGNOSIS — F15.20 METHAMPHETAMINE USE DISORDER, SEVERE (H): ICD-10-CM

## 2021-06-21 LAB
AMPHETAMINES UR QL SCN: NORMAL
BARBITURATES UR QL: NORMAL
BENZODIAZ UR QL: NORMAL
CANNABINOIDS UR QL SCN: NORMAL
COCAINE UR QL: NORMAL
CREAT UR-MCNC: 114.4 MG/DL
ETHANOL UR CFM-MCNC: <10 MG/DL
METHADONE UR QL SCN: NORMAL
OPIATES UR QL SCN: NORMAL
OXYCODONE UR QL: NORMAL
PCP UR QL SCN: NORMAL

## 2021-06-25 ENCOUNTER — AMBULATORY - HEALTHEAST (OUTPATIENT)
Dept: ADDICTION MEDICINE | Facility: HOSPITAL | Age: 31
End: 2021-06-25

## 2021-06-25 NOTE — PROGRESS NOTES
Weekly Progress Note 05/24/2021 - 5/28/2021  Ko, Ko  1990  [id number]      D) Pt attended 1/2 groups this week with 1 unexcused absence. Patient attended 0 individual sessions this week. A) Staff facilitated groups and reviewed tx progress. Assessed for [facility]. R) No VAP needed at this time.   Any significant events, defines as events that impact patients relationship with others inside and outside of treatment: No  Indicate any changes or monitoring of physical or mental health problems: None    Indicate involvement by any outside supports: None  IAPP reviewed and modified as needed. NA  Pt working on the following dimensions:  Dimension #1 - Withdrawal Potential - Risk 0. Client reports last date of use as 04/27/2021. No signs or symptoms of intoxication or withdrawal noted or reported.   Specific goals from treatment plan addressed this week:  Client to maintain abstinence throughout outpatient treatment.   Effectiveness of strategies:  Progressing: No Changes Noted.     Dimension #2 - Biomedical - Risk 0. The client reports he has no medical needs unmet and can obtain any necessary medical care on his own.    Specific goals from treatment plan addressed this week:  Client to maintain stable health throughout outpatient treatment.  Effectiveness of strategies:  Progressing: Client reports no new issues in this area this week.     Dimension #3 - Emotional/Behavioral/Cognitive - Risk 0 No emotional or mental health problems noted or reported.  Specific goals from treatment plan addressed this week:  N.A  Effectiveness of strategies:  N/A     Dimension #4 - Treatment Acceptance/Resistance - Risk 2. The Client may lack insight into his substance use problems due in part to cultural barriers that interfere with his understanding of the need for treatment.   Specific goals from treatment plan addressed this week:  Client will increase motivation towards recovery by participating in outpatient programming.  "  Effectiveness of strategies:  Progressing: Client attended 1/2 groups via tele-health application this week and participated in group discussion with prompting. Client stated during check in that he is sober because he doesn't want to go to [facility] and that he was grateful to be in group.     Dimension #5 - Relapse Potential - Risk 3. The client did not demonstrate any awareness of situations or emotions that trigger his drinking or display knowledge or understanding of the skills or coping mechanisms necessary to avoid those triggers.   Specific goals from treatment plan addressed this week:  Client will gain understanding and insight into situations and emotions that trigger his substance use.  Effectiveness of strategies:  Progressing: Client denied any use during check-in this week.     Dimension #6 - Recovery Environment - Risk 3. Client is not currently attending any community based recovery support groups and did not identify any reliable means of recovery support.  Specific goals from treatment plan addressed this week:  Client will explore and identify healthy sober supports in his community.  Effectiveness of strategies:  Progressing: Client did not attend the [facility] Community Support group last Saturday.     T) Treatment plan updated: No. Client notified and in agreement: NA.  Client educated on \"How Has My Use Affected My Family . Client has completed 13 of 80 program hours at this time. Projected discharge date is 10/13/2021. Current discharge plan is not yet developed.   JOSE Patel  5/28/2021, 1:52 PM  "

## 2021-06-25 NOTE — TELEPHONE ENCOUNTER
This writer called Jayesh Mcconnell regarding schedule appointment for urine test. NO answer leave a voice mail to call back.   tSu Hansen, Bilingual Community Health Liaison  6/15/2021  8:33 AM    .

## 2021-06-25 NOTE — PROGRESS NOTES
Telemedicine Visit: The patient's condition can be safely assessed and treated via synchronous audio and visual telemedicine encounter.      Reason for Telemedicine Visit: Services only offered via telehealth.    Originating Site (Patient Location): Patient's home.    Distant Site (Provider Location): [facility] Outpatient Setting: [facility].    Consent:  The patient/guardian has verbally consented to: the potential risks and benefits of telemedicine (video visit) versus in person care; bill my insurance or make self-payment for services provided; and responsibility for payment of non-covered services.     Mode of Communication:  Video Conference via Zoom.    Call Started at: 1:00 PM  Call Ended at: 2:50 PM    As the provider I attest to compliance with applicable laws and regulations related to telemedicine.    Jayesh Mcconnell attended 2 hours of group today.     The group topic was  How Has My Use Affected My Family  Patient was responsive to topic.     Patient's engagement in the group session: medium.    Total group size: 7    LE Patel [facility]  5/25/2021, 3:17 PM,

## 2021-06-25 NOTE — PROGRESS NOTES
Telemedicine Visit: The patient's condition can be safely assessed and treated via synchronous audio and visual telemedicine encounter.      Reason for Telemedicine Visit: Services only offered via telehealth.    Originating Site (Patient Location): Patient's home.    Distant Site (Provider Location): [facility] Outpatient Setting: [facility].    Consent:  The patient/guardian has verbally consented to: the potential risks and benefits of telemedicine (video visit) versus in person care; bill my insurance or make self-payment for services provided; and responsibility for payment of non-covered services.     Mode of Communication:  Video Conference via Zoom.    Call Started at: 1:00 PM  Call Ended at: 3:00 PM    As the provider I attest to compliance with applicable laws and regulations related to telemedicine.    Jayesh Mcconnell attended 2 hours of group today.     The group topic was  What is Recovery?  Patient was responsive to topic.     Patient's engagement in the group session: medium.    Total group size: 7    [facility]  6/3/2021, 3:08 PM.

## 2021-06-25 NOTE — PROGRESS NOTES
ABSENT NOTE:    Jayesh Mcconnell was absent from group 5/27/2021 . This absence was not excused. This writer attempted to contact patient via phone. Patient is expected to be in group on 06/01/2021.     LE Espinal, [facility]  5/27/2021 , 2:40 PM

## 2021-06-26 NOTE — TELEPHONE ENCOUNTER
Spoke to a client on the phone regarding ask a client come to [facility] to do urine test for the group. When a client get to [facility] this writer will  take a client to the lab.     Stu Hansen, Bilingual Community Health Liaison  6/21/2021  11:06 AM

## 2021-06-26 NOTE — PROGRESS NOTES
Weekly Progress Note 06/07/2021 - 6/11/2021  Ko, Ko  1990  [id number]      D) Pt attended 2/2 groups this week with 0 absences. Patient attended 0 individual sessions this week. A) Staff facilitated groups and reviewed tx progress. Assessed for [facility]. R) No VAP needed at this time.   Any significant events, defines as events that impact patients relationship with others inside and outside of treatment: No  Indicate any changes or monitoring of physical or mental health problems: None    Indicate involvement by any outside supports: None  IAPP reviewed and modified as needed. NA  Pt working on the following dimensions:  Dimension #1 - Withdrawal Potential - Risk 0. Client reports last date of use as 04/27/2021. No signs or symptoms of intoxication or withdrawal noted or reported.   Specific goals from treatment plan addressed this week:  Client to maintain abstinence throughout outpatient treatment.   Effectiveness of strategies:  Progressing: No Changes Noted.     Dimension #2 - Biomedical - Risk 0. The client reports he has no medical needs unmet and can obtain any necessary medical care on his own.    Specific goals from treatment plan addressed this week:  Client to maintain stable health throughout outpatient treatment.  Effectiveness of strategies:  Progressing: Client reports no new issues in this area this week.     Dimension #3 - Emotional/Behavioral/Cognitive - Risk 0 No emotional or mental health problems noted or reported.  Specific goals from treatment plan addressed this week:  N.A  Effectiveness of strategies:  N/A     Dimension #4 - Treatment Acceptance/Resistance - Risk 2. The Client may lack insight into his substance use problems due in part to cultural barriers that interfere with his understanding of the need for treatment.   Specific goals from treatment plan addressed this week:  Client will increase motivation towards recovery by participating in outpatient programming.   Effectiveness of  "strategies:  Progressing: Client attended 2/2 groups via tele-health application this week and participated in group discussion with prompting. Client stated during check in that he is sober because he made a decision to stop using and that he was grateful tat his community has this group.     Dimension #5 - Relapse Potential - Risk 3. The client did not demonstrate any awareness of situations or emotions that trigger his drinking or display knowledge or understanding of the skills or coping mechanisms necessary to avoid those triggers.   Specific goals from treatment plan addressed this week:  Client will gain understanding and insight into situations and emotions that trigger his substance use.  Effectiveness of strategies:  Progressing: Client denied any use during check-in this week.     Dimension #6 - Recovery Environment - Risk 3. Client is not currently attending any community based recovery support groups and did not identify any reliable means of recovery support.  Specific goals from treatment plan addressed this week:  Client will explore and identify healthy sober supports in his community.  Effectiveness of strategies:  Progressing: Client did not attend the [facility] Community Support group last Saturday. The [facility] Community Support Group is not currently being held and there are no other resources for recovery support in a language the client can understand.    T) Treatment plan updated: No. Client notified and in agreement: NA.  Client educated on \"What is Addiction - Part 2  and  Benefits and Consequences of Using Alcohol and Drugs . Client has completed 19 of 80 program hours at this time. Client's projected discharge date is 11/10/2021. Current discharge plan is not yet developed.   Kaden Myles [facility]  6/11/2021, 10:57 AM  "

## 2021-06-26 NOTE — PROGRESS NOTES
Telemedicine Visit: The patient's condition can be safely assessed and treated via synchronous audio and visual telemedicine encounter.      Reason for Telemedicine Visit: Services only offered via telehealth.    Originating Site (Patient Location): Patient's home.    Distant Site (Provider Location): [facility] Outpatient Setting: [facility].    Consent:  The patient/guardian has verbally consented to: the potential risks and benefits of telemedicine (video visit) versus in person care; bill my insurance or make self-payment for services provided; and responsibility for payment of non-covered services.     Mode of Communication:  Video Conference via Zoom.    Call Started at: 1:00 PM  Call Ended at: 2:50 PM    As the provider I attest to compliance with applicable laws and regulations related to telemedicine.    Jayesh Mcconnell attended 2 hours of group today.     The group topic was  Benefits and Consequences of Using Alcohol and Drugs  Patient was responsive to topic.     Patient's engagement in the group session: low.    Total group size: 7    LE Patel [facility]  6/10/2021, 3:33 PM.

## 2021-06-26 NOTE — PROGRESS NOTES
Weekly Progress Note 06/14/2021 - 6/18/2021  Ko, Ko  1990  [id number]      D) Pt attended 1/1 groups this week with 0 absences. Patient attended 0 individual sessions this week. A) Staff facilitated groups and reviewed tx progress. Assessed for [facility]. R) No VAP needed at this time.   Any significant events, defines as events that impact patients relationship with others inside and outside of treatment: No  Indicate any changes or monitoring of physical or mental health problems: None    Indicate involvement by any outside supports: None  IAPP reviewed and modified as needed. NA  Pt working on the following dimensions:  Dimension #1 - Withdrawal Potential - Risk 0. Client reports last date of use as 04/27/2021. No signs or symptoms of intoxication or withdrawal noted or reported.   Specific goals from treatment plan addressed this week:  Client to maintain abstinence throughout outpatient treatment.   Effectiveness of strategies:  Progressing: No Changes Noted.     Dimension #2 - Biomedical - Risk 0. The client reports he has no medical needs unmet and can obtain any necessary medical care on his own.    Specific goals from treatment plan addressed this week:  Client to maintain stable health throughout outpatient treatment.  Effectiveness of strategies:  Progressing: Client reports no new issues in this area this week.     Dimension #3 - Emotional/Behavioral/Cognitive - Risk 0 No emotional or mental health problems noted or reported.  Specific goals from treatment plan addressed this week:  N.A  Effectiveness of strategies:  N/A     Dimension #4 - Treatment Acceptance/Resistance - Risk 2. The Client may lack insight into his substance use problems due in part to cultural barriers that interfere with his understanding of the need for treatment.   Specific goals from treatment plan addressed this week:  Client will increase motivation towards recovery by participating in outpatient programming.   Effectiveness of  "strategies:  Progressing: Client attended 1/1 groups via tele-health application this week and participated in group discussion with prompting. Client stated during check in that he is sober because he wants to make his family happy and that he was grateful that his community has this group.     Dimension #5 - Relapse Potential - Risk 3. The client did not demonstrate any awareness of situations or emotions that trigger his drinking or display knowledge or understanding of the skills or coping mechanisms necessary to avoid those triggers.   Specific goals from treatment plan addressed this week:  Client will gain understanding and insight into situations and emotions that trigger his substance use.  Effectiveness of strategies:  Progressing: Client denied any use during check-in this week.     Dimension #6 - Recovery Environment - Risk 3. Client is not currently attending any community based recovery support groups and did not identify any reliable means of recovery support.  Specific goals from treatment plan addressed this week:  Client will explore and identify healthy sober supports in his community.  Effectiveness of strategies:  Progressing: Client did not attend the [facility] Community Support group last Saturday. The [facility] Community Support Group is not currently being held and there are no other resources for recovery support in a language the client can understand.    T) Treatment plan updated: No. Client notified and in agreement: NA.  Client educated on \"Preventing Relapse . Client has completed 21 of 80 program hours at this time. Client's projected discharge date is 11/10/2021. Current discharge plan is not yet developed.   JOSE Patel  6/18/2021, 10:22 AM  "

## 2021-06-26 NOTE — PROGRESS NOTES
Telemedicine Visit: The patient's condition can be safely assessed and treated via synchronous audio and visual telemedicine encounter.      Reason for Telemedicine Visit: Services only offered via telehealth.    Originating Site (Patient Location): Patient's home.    Distant Site (Provider Location): [facility] Outpatient Setting: [facility].    Consent:  The patient/guardian has verbally consented to: the potential risks and benefits of telemedicine (video visit) versus in person care; bill my insurance or make self-payment for services provided; and responsibility for payment of non-covered services.     Mode of Communication:  Video Conference via Zoom.    Call Started at: 1:00 PM  Call Ended at: 3:00 PM    As the provider I attest to compliance with applicable laws and regulations related to telemedicine.    Jayesh Mcconnell attended 2 hours of group today.     The group topic was  Preventing Relapse  Patient was responsive to topic.     Patient's engagement in the group session: medium.    Total group size: 7    LE Patel [facility]  6/15/2021, 3:37 PM.

## 2021-06-26 NOTE — PROGRESS NOTES
Telemedicine Visit: The patient's condition can be safely assessed and treated via synchronous audio and visual telemedicine encounter.      Reason for Telemedicine Visit: Services only offered via telehealth.    Originating Site (Patient Location): Patient's home.    Distant Site (Provider Location): [facility] Outpatient Setting: [facility].    Consent:  The patient/guardian has verbally consented to: the potential risks and benefits of telemedicine (video visit) versus in person care; bill my insurance or make self-payment for services provided; and responsibility for payment of non-covered services.     Mode of Communication:  Video Conference via Zoom.    Call Started at: 1:00 PM  Call Ended at: 3:00 PM    As the provider I attest to compliance with applicable laws and regulations related to telemedicine.    Jayesh Mcconnell attended 2 hours of group today.     The group topic was  What is Addiction; Part 2  Patient was responsive to topic.     Patient's engagement in the group session: low.    Total group size: 6    LE Pat[facility]  6/8/2021, 3:20 PM.

## 2021-06-30 ENCOUNTER — AMBULATORY - HEALTHEAST (OUTPATIENT)
Dept: LAB | Facility: HOSPITAL | Age: 31
End: 2021-06-30

## 2021-06-30 ENCOUNTER — COMMUNICATION - HEALTHEAST (OUTPATIENT)
Dept: ADDICTION MEDICINE | Facility: HOSPITAL | Age: 31
End: 2021-06-30

## 2021-06-30 DIAGNOSIS — F15.20 METHAMPHETAMINE USE DISORDER, SEVERE (H): ICD-10-CM

## 2021-06-30 LAB
AMPHETAMINES UR QL SCN: NORMAL
BARBITURATES UR QL: NORMAL
BENZODIAZ UR QL: NORMAL
CANNABINOIDS UR QL SCN: NORMAL
COCAINE UR QL: NORMAL
CREAT UR-MCNC: 73.6 MG/DL
ETHANOL UR CFM-MCNC: <10 MG/DL
METHADONE UR QL SCN: NORMAL
OPIATES UR QL SCN: NORMAL
OXYCODONE UR QL: NORMAL
PCP UR QL SCN: NORMAL

## 2021-07-04 NOTE — TELEPHONE ENCOUNTER
Telephone Encounter by Stu Hansen,  at 6/30/2021  9:32 AM     Author: Stu Hansen,  Service: -- Author Type:     Filed: 6/30/2021  9:36 AM Encounter Date: 6/30/2021 Status: Signed    : Stu Hansen,  ()       This writer did follow up call to a client NOT answer the phone just leave a voice mail to call back.     Stu Hansen, Bilingual Community Health Liaison  6/30/2021  9:35 AM

## 2021-07-04 NOTE — TELEPHONE ENCOUNTER
Telephone Encounter by Stu Hansen  at 6/30/2021  9:49 AM     Author: Stu Hansen  Service: -- Author Type:     Filed: 6/30/2021  9:53 AM Encounter Date: 6/30/2021 Status: Signed    : Stu Hansen,  ()       A client called back and said that yesterday he didn't  attend the group he thought the group was canceled.He will continues to the group tomorrow on 7/1/2021.

## 2021-07-07 NOTE — PROGRESS NOTES
Weekly Progress Note 06/21/2021 - 6/25/2021  Ko, Ko  1990  [id number]      D) Pt attended 2/2 groups this week with 0 absences. Patient attended 0 individual sessions this week. A) Staff facilitated groups and reviewed tx progress. Assessed for [facility]. R) No VAP needed at this time.   Any significant events, defined as events that impact Client s relationship with others inside and outside of treatment: No  Indicate any changes or monitoring of physical or mental health problems: None  Indicate involvement by any outside supports: None  IAPP reviewed and modified as needed: NA    Pt working on the following dimensions:  Dimension #1 - Withdrawal Potential - Risk 0. Client reports last date of use as 04/27/2021. No signs or symptoms of intoxication or withdrawal noted or reported.   Specific goals from treatment plan addressed this week:  Client to maintain abstinence throughout outpatient treatment.   Effectiveness of strategies:  Progressing: No Changes Noted.     Dimension #2 - Biomedical - Risk 0. The client reports he has no medical needs unmet and can obtain any necessary medical care on his own.    Specific goals from treatment plan addressed this week:  Client to maintain stable health throughout outpatient treatment.  Effectiveness of strategies:  Progressing: Client reports no new issues in this area this week.     Dimension #3 - Emotional/Behavioral/Cognitive - Risk 0 No emotional or mental health problems noted or reported.  Specific goals from treatment plan addressed this week:  N.A  Effectiveness of strategies:  N/A     Dimension #4 - Treatment Acceptance/Resistance - Risk 2. The Client may lack insight into his substance use problems due in part to cultural barriers that interfere with his understanding of the need for treatment.   Specific goals from treatment plan addressed this week:  Client will increase motivation towards recovery by participating in outpatient programming.   Effectiveness of  "strategies:  Progressing: Client attended 2/2 groups via tele-health application this week and participated in group discussion with prompting. Client frequently appears to not be engaged in following lectures or discussions but when prompted states he is listening.  Client stated during check in that he is sober because his drinking makes his family sad and that he was grateful to be able to help his sister.     Dimension #5 - Relapse Potential - Risk 3. The client did not demonstrate any awareness of situations or emotions that trigger his drinking or display knowledge or understanding of the skills or coping mechanisms necessary to avoid those triggers.   Specific goals from treatment plan addressed this week:  Client will gain understanding and insight into situations and emotions that trigger his substance use.  Effectiveness of strategies:  Progressing: Client denied any use during check-in this week.     Dimension #6 - Recovery Environment - Risk 3. Client is not currently attending any community based recovery support groups and did not identify any reliable means of recovery support.  Specific goals from treatment plan addressed this week:  Client will explore and identify healthy sober supports in his community.  Effectiveness of strategies:  Progressing: [facility]. The [facility] Community Support Group is not currently being held and there are no other resources for recovery support in a language the client can understand.    T) Treatment plan updated: No. Client notified and in agreement: NA.  Client educated on \"Denial and Enabling  and  Consequences of Drinking and Using . Client has completed 25 of 80 program hours at this time. Client's projected discharge date is 11/10/2021. Current discharge plan is not yet developed.   Kaden Myles, [place]  6/25/2021, 1:32 PM  "

## 2021-07-13 ENCOUNTER — DOCUMENTATION ONLY (OUTPATIENT)
Dept: ADDICTION MEDICINE | Facility: HOSPITAL | Age: 31
End: 2021-07-13

## 2021-07-13 ENCOUNTER — VIRTUAL VISIT (OUTPATIENT)
Dept: ADDICTION MEDICINE | Facility: HOSPITAL | Age: 31
End: 2021-07-13
Payer: MEDICAID

## 2021-07-13 ENCOUNTER — TELEPHONE (OUTPATIENT)
Dept: ADDICTION MEDICINE | Facility: HOSPITAL | Age: 31
End: 2021-07-13

## 2021-07-13 DIAGNOSIS — F15.20 METHAMPHETAMINE USE DISORDER, MODERATE (H): Primary | ICD-10-CM

## 2021-07-13 PROCEDURE — 999N000103 HC STATISTIC NO CHARGE FACILITY FEE: Mod: GT

## 2021-07-13 NOTE — GROUP NOTE
Group Therapy Documentation    PATIENT'S NAME: Jayesh Mcconnell  MRN:   [id number]  [facility]   1990  [id number]  DATE OF SERVICE: 21  START TIME:  1:00 PM  END TIME:  2:50 PM  FACILITATOR(S): Kaden Myles LADC  TOPIC: BEH Group Therapy  Number of patients attending the group:  7  Group Length:  2 Hours    Group Therapy Type: Addiction    Summary of Group / Topics Discussed:    What is Stress?    Telemedicine Visit: The patient's condition can be safely assessed and treated via synchronous audio and visual telemedicine encounter.      Reason for Telemedicine Visit: Services only offered telehealth    Originating Site (Patient Location): Patient's home    Distant Site (Provider Location): [facility] Outpatient Setting: [facility]    Consent:  The patient/guardian has verbally consented to: the potential risks and benefits of telemedicine (video visit) versus in person care; bill my insurance or make self-payment for services provided; and responsibility for payment of non-covered services.     Mode of Communication:  Video Conference via [employer]    As the provider I attest to compliance with applicable laws and regulations related to telemedicine.            Group Attendance:  Other - Called to report he was ill before group.    Patient's response to the group topic/interactions:  Did not attend.    Patient appeared to be Non-participatory.        Client specific details:  Called in sick..

## 2021-07-13 NOTE — TELEPHONE ENCOUNTER
A client call said that he was sick last night not able to attend the group.     Stu Hansen, Bilingual Community Health Liaison  7/13/2021  1:51 PM

## 2021-07-15 ENCOUNTER — VIRTUAL VISIT (OUTPATIENT)
Dept: ADDICTION MEDICINE | Facility: HOSPITAL | Age: 31
End: 2021-07-15
Payer: MEDICAID

## 2021-07-15 DIAGNOSIS — F15.20 METHAMPHETAMINE USE DISORDER, MODERATE (H): Primary | ICD-10-CM

## 2021-07-15 PROCEDURE — H2035 A/D TX PROGRAM, PER HOUR: HCPCS | Mod: HQ,GT

## 2021-07-15 NOTE — GROUP NOTE
Group Therapy Documentation    PATIENT'S NAME: Jayesh Mcconnell  MRN:   [id number]  [facility]   1990  [id number]  DATE OF SERVICE: 7/15/21  START TIME:  1:00 PM  END TIME:  2:50 PM  FACILITATOR(S): Kaden Myles LADC  TOPIC: BEH Group Therapy  Number of patients attending the group:  7  Group Length:  2 Hours  Group Therapy Type: Addiction    Telemedicine Visit: The patient's condition can be safely assessed and treated via synchronous audio and visual telemedicine encounter.      Reason for Telemedicine Visit: Services only offered telehealth    Originating Site (Patient Location): Patient's home    Distant Site (Provider Location): [facility] Outpatient Setting: [facility]    Consent:  The patient/guardian has verbally consented to: the potential risks and benefits of telemedicine (video visit) versus in person care; bill my insurance or make self-payment for services provided; and responsibility for payment of non-covered services.     Mode of Communication:  Video Conference via [employer]    As the provider I attest to compliance with applicable laws and regulations related to telemedicine.      Summary of Group / Topics Discussed:  Stress Management      Group Attendance:  Attended group session    Patient's response to the group topic/interactions:  expressed understanding of topic    Patient appeared to be Passively engaged.        Client specific details:  Client reported he has been spending time with his girlfriend to relax..

## 2021-07-16 ENCOUNTER — DOCUMENTATION ONLY (OUTPATIENT)
Dept: ADDICTION MEDICINE | Facility: HOSPITAL | Age: 31
End: 2021-07-16

## 2021-07-16 NOTE — PROGRESS NOTES
[facility] Weekly Treatment Plan Review      ATTENDANCE for the following date span:  21 - 2021    Date Monday 7/12/21 Tuesday 7/13/21 Wednesday 7/14/21 Thursday 7/15/21 Friday 7/16/21   Group Therapy N/A hours 0  hours N/A hours 2 hours N/A hours   Individual Therapy        Family Therapy        Other (Specify)          Patient did have any absences during this time period (list absence dates and reason for absence).  Called Sick on 21.      Weekly Treatment Plan Review     Treatment Plan initiated on: 21.    Dimension1: Acute Intoxication/Withdrawal Potential -   Previous Dimension Ratin  Current Dimension Ratin  Date of Last Use 21  Any reports of withdrawal symptoms - No        Dimension 2: Biomedical Conditions & Complications -   Previous Dimension Ratin  Current Dimension Ratin  Medical Concerns:  None  Current Medications & Medication Changes:  No current outpatient medications on file.     No current facility-administered medications for this visit.     Medication Prescriber:  N/A  Taking meds as prescribed? No, Nonne Prescribed'  Medication side effects or concerns:  N/A  Outside medical appointments this week (list provider and reason for visit):  None        Dimension 3: Emotional/Behavioral Conditions & Complications -   Previous Dimension Ratin  Current Dimension Ratin  PHQ9   No flowsheet data found.  GAD7   No flowsheet data found.  Mental health diagnosis None  Date of last SIB:  ---  Date of  last SI:  ---  Date of last HI: ---  Behavioral Targets:  N/A  Current MH Assignments:  N/A    Narrative:  N/A      Dimension 4: Treatment Acceptance / Resistance -   Previous Dimension Ratin  Current Dimension Ratin  CLEMENTE Diagnosis:  Stimulant Use Disorder:   , Specify current severity:  Moderate  304.40 (F15.20) Moderate, Amphetamine type substance  Stage - Preparation/Action  Commitment to tx process/Stage of change- Tentative  CLEMENTE  assignments - will increase motivation towards recovery by participating in outpatient programming    Narrative - Client attended 1/2 groups via tele-health application this week and participated in group discussion with prompting. Client stated during check in that he is sober because he doesn't want to go to [facility] and that he was grateful that his sister took him out to a buffet meal.      Dimension 5: Relapse / Continued Problem Potential -   Previous Dimension Rating:  3  Current Dimension Rating:  3  Relapses this week - None  Urges to use - None  UA results - No results found for this or any previous visit (from the past 168 hour(s)).    Narrative- Client denied any use during check-in this week.    Dimension 6: Recovery Environment -   Previous Dimension Rating:  3  Current Dimension Rating:  3  Family Involvement -   Summarize attendance at family groups and family sessions - N/A  Family supportive of treatment?  Yes    Community support group attendance - None  Recreational activities - Hanging out with his girlfriend at the park.    Narrative - Client did not attend the [facility] Community Support group last Saturday. The [facility] Community Support Group is not currently being held and there are no other resources for recovery support in a language the client can understand.    Justification for Continued Treatment at this Level of Care:    The client reports sustained abstinence with no withdrawal concerns and can manage their biomedical concerns.   The client is currently compliant with group expectations.   The client is also continuing to work on the development of coping skills as well as implementation.   The client remains on probation and is working towards getting their  s license back.   The client continues to benefit from the support group therapy offers.    Discharge Planning:  Target Discharge Date/Timeframe:  08/19/21   Med Mgmt Provider/Appt:  N/A   Ind therapy  Provider/Appt:  N/A   Family therapy Provider/Appt:  N/A   Other referrals:  None      Has vulnerable adult status change? No    Service Coordination:  None    Supervision:  None    Are Treatment Plan goals/objectives effective? Yes  *If no, list changes to treatment plan:    Are the current goals meeting client's needs? Yes  *If no, list the changes to treatment plan.    Client Input / Response: Agreeable    *Client agrees with any changes to the treatment plan: N/A  *Client received copy of changes: N/A  *Client is aware of right to access a treatment plan review: Yes

## 2021-07-20 ENCOUNTER — DOCUMENTATION ONLY (OUTPATIENT)
Dept: ADDICTION MEDICINE | Facility: HOSPITAL | Age: 31
End: 2021-07-20

## 2021-07-20 NOTE — PROGRESS NOTES
A client NO show in group today without excused. This writer try to call a client NO answer the phone just leave a voice mail.     Stu Hansen, Bilingual Community Health Liaison  7/20/2021  3:09 PM

## 2021-07-22 ENCOUNTER — DOCUMENTATION ONLY (OUTPATIENT)
Dept: ADDICTION MEDICINE | Facility: HOSPITAL | Age: 31
End: 2021-07-22

## 2021-07-22 NOTE — PROGRESS NOTES
A client no show in group is was no excused. This writer try to contact a client on the phone NO answer Just leave a voice mail to call back.     Stu Hansen, Bilingual Community Health Liaison  7/22/2021  3:09 PM

## 2021-07-23 ENCOUNTER — DOCUMENTATION ONLY (OUTPATIENT)
Dept: ADDICTION MEDICINE | Facility: HOSPITAL | Age: 31
End: 2021-07-23

## 2021-07-23 NOTE — PROGRESS NOTES
Weekly Progress Note 07/19/21 - 07/23/2021  Jayesh, Jayesh  1990  [id number]      D) Pt attended 0 groups  this week with 2, unexcused absences. Patient is currently in phase 1. The patient was absent due to unknown. A) Staff facilitated groups and reviewed tx progress. Assessed for [facility]. R) Unable to assess along six dimensions or for [facility] due to lack of attendance.   T) Patient has completed 31/51 phase 1 hours. Patient anticipated DC date 11/10/2021.    JOSE Patel   7/23/2021 2:35 PM

## 2021-07-26 ENCOUNTER — TELEPHONE (OUTPATIENT)
Dept: ADDICTION MEDICINE | Facility: HOSPITAL | Age: 31
End: 2021-07-26

## 2021-07-26 NOTE — TELEPHONE ENCOUNTER
This writer reach out to a client regarding NO show in the group last week. NO answer the phone just leave a voice mail to back.     Stu Hansen, Bilingual Community Health Liaison  7/26/2021  1:30 PM

## 2021-07-27 ENCOUNTER — VIRTUAL VISIT (OUTPATIENT)
Dept: ADDICTION MEDICINE | Facility: HOSPITAL | Age: 31
End: 2021-07-27
Payer: MEDICAID

## 2021-07-27 DIAGNOSIS — F15.20 METHAMPHETAMINE USE DISORDER, MODERATE (H): Primary | ICD-10-CM

## 2021-07-27 PROCEDURE — H2035 A/D TX PROGRAM, PER HOUR: HCPCS | Mod: HQ,GT

## 2021-07-27 NOTE — GROUP NOTE
Group Therapy Documentation    PATIENT'S NAME: Jayesh Mcconnell  MRN:   [id number]  [facility]   1990  [id number]  DATE OF SERVICE: 21  START TIME:  1:00 PM  END TIME:  3:00 PM  FACILITATOR(S): Kaden Myles LADC  TOPIC: BEH Group Therapy  Telemedicine Visit: The patient's condition can be safely assessed and treated via synchronous audio and visual telemedicine encounter.      Reason for Telemedicine Visit: Services only offered telehealth    Originating Site (Patient Location): Patient's home    Distant Site (Provider Location): [facility] [place] Outpatient Setting: [facility]    Consent:  The patient/guardian has verbally consented to: the potential risks and benefits of telemedicine (video visit) versus in person care; bill my insurance or make self-payment for services provided; and responsibility for payment of non-covered services.     Mode of Communication:  Video Conference via Uberseq    As the provider I attest to compliance with applicable laws and regulations related to telemedicine. Number of patients attending the group:  9  Group Length:  2 Hours    Group Therapy Type: Addiction    Summary of Group / Topics Discussed:    Emotional Regulation      Group Attendance:  {Group Attendance:701080}    Patient's response to the group topic/interactions:  {OPBEHCLIENTRESPONSE:[id number]}    Patient appeared to be {Engagement:[id number]}.        Client specific details:  ***.

## 2021-07-27 NOTE — GROUP NOTE
Group Therapy Documentation    PATIENT'S NAME: Jayesh Mcconnell  MRN:   [id number]  :   1990  [id number]  DATE OF SERVICE: 21  START TIME:  1:00 PM  END TIME:  3:00 PM  FACILITATOR(S): Kaden Myles LADC  TOPIC: BEH Group Therapy  Telemedicine Visit: The patient's condition can be safely assessed and treated via synchronous audio and visual telemedicine encounter.      Reason for Telemedicine Visit: Services only offered telehealth    Originating Site (Patient Location): Patient's home    Distant Site (Provider Location): [facility] Outpatient Setting: [facility]    Consent:  The patient/guardian has verbally consented to: the potential risks and benefits of telemedicine (video visit) versus in person care; bill my insurance or make self-payment for services provided; and responsibility for payment of non-covered services.     Mode of Communication:  Video Conference via Zoom    As the provider I attest to compliance with applicable laws and regulations related to telemedicine. Number of patients attending the group:  7  Group Length:  2 Hours    Group Therapy Type: Addiction    Summary of Group / Topics Discussed:    Emotional Regulation      Group Attendance:  Attended group session    Patient's response to the group topic/interactions:  cooperative with task    Patient appeared to be Attentive.        Client specific details:  Client stated during check in that he is focused on Work, and explained that his absence last week was due to working.

## 2021-07-29 ENCOUNTER — DOCUMENTATION ONLY (OUTPATIENT)
Dept: ADDICTION MEDICINE | Facility: HOSPITAL | Age: 31
End: 2021-07-29

## 2021-07-29 NOTE — PROGRESS NOTES
ABSENT NOTE  A client no show in group today.   This writer spoke to a client sister on the phone said that  Jayesh Mcconnell was arrested by  at gas station and was in care home yesterday.A client sister was  not sure why he got arrested.     Stu Hansen, Bilingual Community Health Liaison  7/29/2021  3:06 PM

## 2021-07-30 ENCOUNTER — DOCUMENTATION ONLY (OUTPATIENT)
Dept: ADDICTION MEDICINE | Facility: HOSPITAL | Age: 31
End: 2021-07-30

## 2021-07-30 NOTE — PROGRESS NOTES
[facility] Weekly Treatment Plan Review  ATTENDANCE for the following date span:  21 - 2021    Date 21   Group Therapy N/A hours 2.0 hours N/A hours 0 hours N/A hours   Individual Therapy        Family Therapy        Other (Specify)          Patient had one absence during this time period: 21, Unexcused, No Call, No Show. Client's sister reported when called that the client had been jailed.    Weekly Treatment Plan Review     Treatment Plan initiated on: 21.    Dimension1: Acute Intoxication/Withdrawal Potential -   Previous Dimension Ratin  Current Dimension Ratin  Date of Last Use 21  Any reports of withdrawal symptoms - No    Dimension 2: Biomedical Conditions & Complications -   Previous Dimension Ratin  Current Dimension Ratin  Medical Concerns:  None  Current Medications & Medication Changes:  No current outpatient medications on file.     No current facility-administered medications for this visit.     Medication Prescriber:  N/A  Taking meds as prescribed? No, Nonne Prescribed'  Medication side effects or concerns:  N/A  Outside medical appointments this week (list provider and reason for visit):  None    Dimension 3: Emotional/Behavioral Conditions & Complications -   Previous Dimension Ratin  Current Dimension Ratin  PHQ9   No flowsheet data found.  GAD7   No flowsheet data found.  Mental health diagnosis None  Date of last SIB:  ---  Date of  last SI:  ---  Date of last HI: ---  Behavioral Targets:  N/A  Current MH Assignments:  N/A    Narrative:  N/A    Dimension 4: Treatment Acceptance / Resistance -   Previous Dimension Ratin  Current Dimension Ratin  CLEMENTE Diagnosis:  Stimulant Use Disorder:   , Specify current severity:  Moderate  304.40 (F15.20) Moderate, Amphetamine type substance  Stage - Preparation/Action  Commitment to tx process/Stage of change-  Tentative  CLEMENTE assignments - will increase motivation towards recovery by participating in outpatient programming    Narrative - Client attended 1/2 groups via tele-health application this week and participated in group discussion with prompting. Client stated during check in that he is sober because he is in a relationship and that he was grateful to be in group.    Dimension 5: Relapse / Continued Problem Potential -   Previous Dimension Rating:  3  Current Dimension Rating:  3  Relapses this week - None  Urges to use - None  UA results - No results found for this or any previous visit (from the past 168 hour(s)).    Narrative- Client denied any use during check-in this week.    Dimension 6: Recovery Environment -   Previous Dimension Rating:  3  Current Dimension Rating:  3  Family Involvement -   Summarize attendance at family groups and family sessions - N/A  Family supportive of treatment?  Yes    Community support group attendance - None  Recreational activities - Client stated he had no time for recreation this week - just work.    Narrative - Client did not attend the [facility] Community Support group last Saturday. The [facility] Community Support Group is not currently being held and there are no other resources for recovery support in a language the client can understand.    Justification for Continued Treatment at this Level of Care:    The client reports sustained abstinence with no withdrawal concerns and can manage their biomedical concerns.   The client is currently compliant with group expectations.   The client is also continuing to work on the development of coping skills as well as implementation.   The client remains on probation and is working towards getting their  s license back.   The client continues to benefit from the support group therapy offers.    Discharge Planning:  Target Discharge Date/Timeframe: 12/08/21   Med Mgmt Provider/Appt:  N/A   Ind therapy Provider/Appt:   N/A   Family therapy Provider/Appt:  N/A   Other referrals:  None    Has vulnerable adult status change? No  Service Coordination:  None  Supervision:  None    Are Treatment Plan goals/objectives effective? Yes  *If no, list changes to treatment plan:    Are the current goals meeting client's needs? Yes  *If no, list the changes to treatment plan.    Client Input / Response: Agreeable    *Client agrees with any changes to the treatment plan: N/A  *Client received copy of changes: N/A  *Client is aware of right to access a treatment plan review: Yes

## 2021-08-02 ENCOUNTER — TELEPHONE (OUTPATIENT)
Dept: ADDICTION MEDICINE | Facility: HOSPITAL | Age: 31
End: 2021-08-02
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
## 2021-08-03 ENCOUNTER — VIRTUAL VISIT (OUTPATIENT)
Dept: ADDICTION MEDICINE | Facility: HOSPITAL | Age: 31
End: 2021-08-03
Attending: FAMILY MEDICINE
Payer: MEDICAID

## 2021-08-03 DIAGNOSIS — F15.20 METHAMPHETAMINE USE DISORDER, MODERATE (H): Primary | ICD-10-CM

## 2021-08-03 PROCEDURE — H2035 A/D TX PROGRAM, PER HOUR: HCPCS | Mod: HQ,GT

## 2021-08-03 NOTE — GROUP NOTE
Group Therapy Documentation    PATIENT'S NAME: Jayesh Mcconnell  MRN:   [id number]  [facility]   1990  [id number]  DATE OF SERVICE: 21  START TIME:  1:00 PM  END TIME:  3:00 PM  FACILITATOR(S): Kaden Myles LADC  TOPIC: BEH Group Therapy  Telemedicine Visit: The patient's condition can be safely assessed and treated via synchronous audio and visual telemedicine encounter.      Reason for Telemedicine Visit: Services only offered telehealth    Originating Site (Patient Location): Patient's home    Distant Site (Provider Location): [facility] Outpatient Setting: [facility]    Consent:  The patient/guardian has verbally consented to: the potential risks and benefits of telemedicine (video visit) versus in person care; bill my insurance or make self-payment for services provided; and responsibility for payment of non-covered services.     Mode of Communication:  Video Conference via [employer]    As the provider I attest to compliance with applicable laws and regulations related to telemedicine.  Number of patients attending the group:  7  Group Length:  2 Hours    Group Therapy Type: Addiction    Summary of Group / Topics Discussed:    Stress Management, Part 2      Group Attendance:  Attended group session    Patient's response to the group topic/interactions:  cooperative with task, did not discuss personal experience and did not share thoughts verbally    Patient appeared to be Inattentive and Distracted.        Client specific details:  Had music playing and children in the room with him during group..

## 2021-08-04 ENCOUNTER — TELEPHONE (OUTPATIENT)
Dept: ADDICTION MEDICINE | Facility: HOSPITAL | Age: 31
End: 2021-08-04

## 2021-08-04 NOTE — TELEPHONE ENCOUNTER
This writer help  A client made a phone call to his  to update new phone number. NO answer the phone just leave a voice mail for  A client.     Stu Hansen, Bilingual Community Health Liaison  8/4/2021  9:34 AM

## 2021-08-05 ENCOUNTER — VIRTUAL VISIT (OUTPATIENT)
Dept: ADDICTION MEDICINE | Facility: HOSPITAL | Age: 31
End: 2021-08-05
Attending: FAMILY MEDICINE
Payer: MEDICAID

## 2021-08-05 DIAGNOSIS — F15.20 METHAMPHETAMINE USE DISORDER, MODERATE (H): Primary | ICD-10-CM

## 2021-08-05 PROCEDURE — H2035 A/D TX PROGRAM, PER HOUR: HCPCS | Mod: HQ,GT

## 2021-08-05 NOTE — GROUP NOTE
Group Therapy Documentation    PATIENT'S NAME: Jayesh Mcconnell  MRN:   [id number]  [facility]   1990  [id number]  DATE OF SERVICE: 21  START TIME:  1:00 PM  END TIME:  3:00 PM  FACILITATOR(S): Kaden Myles LADC  TOPIC: BEH Group Therapy  Telemedicine Visit: The patient's condition can be safely assessed and treated via synchronous audio and visual telemedicine encounter.      Reason for Telemedicine Visit: Services only offered telehealth    Originating Site (Patient Location): Patient's home    Distant Site (Provider Location): [facility] Outpatient Setting: [facility]    Consent:  The patient/guardian has verbally consented to: the potential risks and benefits of telemedicine (video visit) versus in person care; bill my insurance or make self-payment for services provided; and responsibility for payment of non-covered services.     Mode of Communication:  Video Conference via [employer]Ed    As the provider I attest to compliance with applicable laws and regulations related to telemedicine.    Number of patients attending the group:  7    Group Length:  2 Hours    Group Therapy Type: Addiction    Summary of Group / Topics Discussed:    Self-Awareness      Group Attendance:  Attended group session    Patient's response to the group topic/interactions:  cooperative with task, did not discuss personal experience and did not share thoughts verbally    Patient appeared to be Passively engaged.        Client specific details:  Client reported doing nothing for recreation or recovery this week..

## 2021-08-06 ENCOUNTER — DOCUMENTATION ONLY (OUTPATIENT)
Dept: ADDICTION MEDICINE | Facility: HOSPITAL | Age: 31
End: 2021-08-06

## 2021-08-06 NOTE — PROGRESS NOTES
[facility] Weekly Treatment Plan Review    ATTENDANCE for the following date span:  2021 - 2021    Date Monday 8/2/21 Tuesday 8/3/21 Wednesday 8/4/21 Thursday 8/5/21 Friday 8/6/21   Group Therapy N/A hours 2.0 hours N/A hours 2.0 hours N/A hours   Individual Therapy        Family Therapy        Other (Specify)          Patient did not have any absences during this period (list absence dates and reason for absence).    Weekly Treatment Plan Review     Treatment Plan initiated on: 21.    Dimension1: Acute Intoxication/Withdrawal Potential -   Previous Dimension Ratin  Current Dimension Ratin  Date of Last Use 21  Any reports of withdrawal symptoms - No    Dimension 2: Biomedical Conditions & Complications -   Previous Dimension Ratin  Current Dimension Ratin  Medical Concerns:  None  Current Medications & Medication Changes:  No current outpatient medications on file.     No current facility-administered medications for this visit.     Medication Prescriber:  N/A  Taking meds as prescribed? No, Nonne Prescribed'  Medication side effects or concerns:  N/A  Outside medical appointments this week (list provider and reason for visit):  None    Dimension 3: Emotional/Behavioral Conditions & Complications -   Previous Dimension Ratin  Current Dimension Ratin  PHQ9   No flowsheet data found.  GAD7   No flowsheet data found.  Mental health diagnosis None  Date of last SIB:  ---  Date of  last SI:  ---  Date of last HI: ---  Behavioral Targets:  N/A  Current MH Assignments:  N/A    Narrative:  N/A    Dimension 4: Treatment Acceptance / Resistance -   Previous Dimension Ratin  Current Dimension Ratin  CLEMENTE Diagnosis:  Stimulant Use Disorder:   , Specify current severity:  Moderate  304.40 (F15.20) Moderate, Amphetamine type substance  Stage - Preparation/Action  Commitment to tx process/Stage of change- Tentative  CLEMENTE assignments - will increase motivation  towards recovery by participating in outpatient programming    Narrative - Client attended 2/2 groups via [employer]-health application this week and participated in group discussion with prompting. Client stated during check in that he is sober because wants to get rid of the problems his use has caused and that he was grateful to be able to help his sister.    Dimension 5: Relapse / Continued Problem Potential -   Previous Dimension Rating:  3  Current Dimension Rating:  3  Relapses this week - None  Urges to use - None  UA results - No results found for this or any previous visit (from the past 168 hour(s)).    Narrative- Client denied any use during check-in this week.    Dimension 6: Recovery Environment -   Previous Dimension Rating:  3  Current Dimension Rating:  3  Family Involvement -   Summarize attendance at family groups and family sessions - N/A  Family supportive of treatment?  Yes    Community support group attendance - None  Recreational activities - Client stated he had no time for recreation this week - just work.    Narrative - Client did not attend the [facility] Community Support group last Saturday. The [facility] Community Support Group is not currently being held and there are no other resources for recovery support in a language the client can understand.    Justification for Continued Treatment at this Level of Care:    The client reports sustained abstinence with no withdrawal concerns and can manage their biomedical concerns.   The client is currently compliant with group expectations.   The client is also continuing to work on the development of coping skills as well as implementation.   The client remains on probation and is working towards getting their  s license back.   The client continues to benefit from the support group therapy offers.    Discharge Planning:  Target Discharge Date/Timeframe: 12/08/21   Med Mgmt Provider/Appt:  N/A   Ind therapy Provider/Appt:  N/A    Family therapy Provider/Appt:  N/A   Other referrals:  None    Has vulnerable adult status change? No  Service Coordination:  None  Supervision:  Client met briefly with Bilingual  Stu Hansen this week.    Are Treatment Plan goals/objectives effective? Yes  *If no, list changes to treatment plan:    Are the current goals meeting client's needs? Yes  *If no, list the changes to treatment plan.    Client Input / Response: Agreeable    *Client agrees with any changes to the treatment plan: N/A  *Client received copy of changes: N/A  *Client is aware of right to access a treatment plan review: Yes

## 2021-08-10 ENCOUNTER — VIRTUAL VISIT (OUTPATIENT)
Dept: ADDICTION MEDICINE | Facility: HOSPITAL | Age: 31
End: 2021-08-10
Attending: FAMILY MEDICINE
Payer: MEDICAID

## 2021-08-10 DIAGNOSIS — F15.20 METHAMPHETAMINE USE DISORDER, MODERATE (H): Primary | ICD-10-CM

## 2021-08-10 PROCEDURE — H2035 A/D TX PROGRAM, PER HOUR: HCPCS | Mod: HQ,GT

## 2021-08-10 NOTE — GROUP NOTE
Group Therapy Documentation    PATIENT'S NAME: Jayesh Mcconnell  MRN:   [id number]  [facility]   1990  [id number]  DATE OF SERVICE: 8/10/21  START TIME:  1:00 PM  END TIME:  3:00 PM  FACILITATOR(S): Kaden Myles LADC  TOPIC: BEH Group Therapy  Telemedicine Visit: The patient's condition can be safely assessed and treated via synchronous audio and visual telemedicine encounter.      Reason for Telemedicine Visit: Services only offered telehealth    Originating Site (Patient Location): Patient's home    Distant Site (Provider Location): [facility] Outpatient Setting: [facility]    Consent:  The patient/guardian has verbally consented to: the potential risks and benefits of telemedicine (video visit) versus in person care; bill my insurance or make self-payment for services provided; and responsibility for payment of non-covered services.     Mode of Communication:  Video Conference via Zoom    As the provider I attest to compliance with applicable laws and regulations related to telemedicine.    Number of patients attending the group:  7    Group Length:  2 Hours    Group Therapy Type: Addiction    Summary of Group / Topics Discussed:    What Does a Healthy Family Look Like?      Group Attendance:  Attended group session    Patient's response to the group topic/interactions:  did not share thoughts verbally    Patient appeared to be Distracted and Passively engaged.        Client specific details:  Discussed thinking about the future as a Recovery skill.

## 2021-08-12 ENCOUNTER — DOCUMENTATION ONLY (OUTPATIENT)
Dept: ADDICTION MEDICINE | Facility: HOSPITAL | Age: 31
End: 2021-08-12

## 2021-08-12 ENCOUNTER — VIRTUAL VISIT (OUTPATIENT)
Dept: ADDICTION MEDICINE | Facility: HOSPITAL | Age: 31
End: 2021-08-12
Attending: FAMILY MEDICINE
Payer: MEDICAID

## 2021-08-12 DIAGNOSIS — F15.20 METHAMPHETAMINE USE DISORDER, MODERATE (H): Primary | ICD-10-CM

## 2021-08-12 PROCEDURE — H2035 A/D TX PROGRAM, PER HOUR: HCPCS | Mod: HQ,GT

## 2021-08-12 NOTE — GROUP NOTE
"Group Therapy Documentation    PATIENT'S NAME: Jayesh Mcconnell  MRN:   [id number]  :   1990  [id number]  DATE OF SERVICE: 21  START TIME:  1:00 PM  END TIME:  2:50 PM  FACILITATOR(S): Kaden Myles LADC  TOPIC: BEH Group Therapy  Telemedicine Visit: The patient's condition can be safely assessed and treated via synchronous audio and visual telemedicine encounter.      Reason for Telemedicine Visit: Services only offered telehealth    Originating Site (Patient Location): Patient's home    Distant Site (Provider Location): [facility] Outpatient Setting: [facility]    Consent:  The patient/guardian has verbally consented to: the potential risks and benefits of telemedicine (video visit) versus in person care; bill my insurance or make self-payment for services provided; and responsibility for payment of non-covered services.     Mode of Communication:  Video Conference via Zoom    As the provider I attest to compliance with applicable laws and regulations related to telemedicine.    Number of patients attending the group:  7    Group Length:  2 Hours- Client left after break and did not return, attending only 1 hour.    Group Therapy Type: Addiction    Summary of Group / Topics Discussed:    What is Family Domestic Violence?    Group Attendance:  Attended group session    Patient's response to the group topic/interactions:  cooperative with task, did not discuss personal experience and did not share thoughts verbally    Patient appeared to be Passively engaged.        Client specific details:  Stated \"Planning not to drink\" was his Recovery Strategy.      "

## 2021-08-13 NOTE — PROGRESS NOTES
[facility] Weekly Treatment Plan Review    ATTENDANCE for the following date span:  2021 - 2021    Date Monday 8/9/21 Tuesday 8/10/21 Wednesday 8/11/21 Thursday 8/12/21 Friday 8/13/21   Group Therapy N/A hours 2.0 hours N/A hours 1.0  hours N/A hours   Individual Therapy        Family Therapy        Other (Specify)          Patient did not have any absences during this period but left group early on  and did not return.    Weekly Treatment Plan Review     Treatment Plan initiated on: 21.    Dimension1: Acute Intoxication/Withdrawal Potential -   Previous Dimension Ratin  Current Dimension Ratin  Date of Last Use 21  Any reports of withdrawal symptoms - No    Dimension 2: Biomedical Conditions & Complications -   Previous Dimension Ratin  Current Dimension Ratin  Medical Concerns:  None  Current Medications & Medication Changes:  No current outpatient medications on file.     No current facility-administered medications for this visit.     Medication Prescriber:  N/A  Taking meds as prescribed? No, Nonne Prescribed'  Medication side effects or concerns:  N/A  Outside medical appointments this week (list provider and reason for visit):  None    Dimension 3: Emotional/Behavioral Conditions & Complications -   Previous Dimension Ratin  Current Dimension Ratin  PHQ9   No flowsheet data found.  GAD7   No flowsheet data found.  Mental health diagnosis None  Date of last SIB:  ---  Date of  last SI:  ---  Date of last HI: ---  Behavioral Targets:  N/A  Current MH Assignments:  N/A    Narrative:  N/A    Dimension 4: Treatment Acceptance / Resistance -   Previous Dimension Ratin  Current Dimension Ratin  CLEMENTE Diagnosis:  Stimulant Use Disorder:   , Specify current severity:  Moderate  304.40 (F15.20) Moderate, Amphetamine type substance  Stage - Preparation/Action  Commitment to tx process/Stage of change- Tentative  CLEMENTE assignments - will increase  motivation towards recovery by participating in outpatient programming    Narrative - Client attended 2/2 groups via [employer]-health application this week and participated in group discussion with prompting. Client stated during check in that he is sober because it is bad to drink in front of kids and that he was grateful to be learning in group.    Dimension 5: Relapse / Continued Problem Potential -   Previous Dimension Rating:  3  Current Dimension Rating:  3  Relapses this week - None  Urges to use - None  UA results - No results found for this or any previous visit (from the past 168 hour(s)).    Narrative- Client denied any use during check-in this week.    Dimension 6: Recovery Environment -   Previous Dimension Rating:  3  Current Dimension Rating:  3  Family Involvement -   Summarize attendance at family groups and family sessions - N/A  Family supportive of treatment?  Yes    Community support group attendance - None  Recreational activities - Walking in [place] with nieces and Nephews and girlfriend.    Narrative - Client did not attend the [facility] Community Support group last Saturday. The [facility] Community Support Group is not currently being held and there are no other resources for recovery support in a language the client can understand.    Justification for Continued Treatment at this Level of Care:    The client reports sustained abstinence with no withdrawal concerns and can manage their biomedical concerns.   The client is currently compliant with group expectations.   The client is also continuing to work on the development of coping skills as well as implementation.   The client remains on probation and is working towards getting their  s license back.   The client continues to benefit from the support group therapy offers.    Discharge Planning:  Target Discharge Date/Timeframe: 12/08/21   Med Mgmt Provider/Appt:  N/A   Ind therapy Provider/Appt:  N/A   Family therapy  Provider/Appt:  N/A   Other referrals:  None    Has vulnerable adult status change? No  Service Coordination:  None  Supervision:  Client met briefly with Bilingual  Stu Hansen this week.    Are Treatment Plan goals/objectives effective? Yes  *If no, list changes to treatment plan:    Are the current goals meeting client's needs? Yes  *If no, list the changes to treatment plan.    Client Input / Response: Agreeable    *Client agrees with any changes to the treatment plan: N/A  *Client received copy of changes: N/A  *Client is aware of right to access a treatment plan review: Yes

## 2021-08-17 ENCOUNTER — VIRTUAL VISIT (OUTPATIENT)
Dept: ADDICTION MEDICINE | Facility: HOSPITAL | Age: 31
End: 2021-08-17
Attending: FAMILY MEDICINE
Payer: MEDICAID

## 2021-08-17 DIAGNOSIS — F15.20 METHAMPHETAMINE USE DISORDER, MODERATE (H): Primary | ICD-10-CM

## 2021-08-17 PROCEDURE — H2035 A/D TX PROGRAM, PER HOUR: HCPCS | Mod: HQ,GT

## 2021-08-17 NOTE — GROUP NOTE
"Group Therapy Documentation    PATIENT'S NAME: Jayesh Mcconnell  MRN:   [id number]  :   1990  [id number]  DATE OF SERVICE: 21  START TIME:  1:00 PM  END TIME:  3:00 PM  FACILITATOR(S): Kaden Myles LADC  TOPIC: BEH Group Therapy  Telemedicine Visit: The patient's condition can be safely assessed and treated via synchronous audio and visual telemedicine encounter.      Reason for Telemedicine Visit: Services only offered telehealth    Originating Site (Patient Location): Patient's home    Distant Site (Provider Location): [facility] Outpatient Setting: [facility]    Consent:  The patient/guardian has verbally consented to: the potential risks and benefits of telemedicine (video visit) versus in person care; bill my insurance or make self-payment for services provided; and responsibility for payment of non-covered services.     Mode of Communication:  Video Conference via Zoom    As the provider I attest to compliance with applicable laws and regulations related to telemedicine.    Number of patients attending the group:  6    Group Length:  2 Hours    Group Therapy Type: Addiction    Summary of Group / Topics Discussed:    Connecting with Others in Recovery      Group Attendance:  Attended group session    Patient's response to the group topic/interactions:  expressed understanding of topic    Patient appeared to be Passively engaged.        Client specific details:  Stated \"hanging out with relatives\" was his recovery skill.      "

## 2021-08-20 ENCOUNTER — DOCUMENTATION ONLY (OUTPATIENT)
Dept: ADDICTION MEDICINE | Facility: HOSPITAL | Age: 31
End: 2021-08-20

## 2021-08-20 NOTE — PROGRESS NOTES
[facility] Weekly Treatment Plan Review    ATTENDANCE for the following date span:  2021 - 2021    Date Monday 8/16/21 Tuesday 8/17/21 Wednesday 8/18/21 Thursday 8/19/21 Friday 8/20/21   Group Therapy N/A hours 2.0 hours N/A hours 0.0 hours N/A hours   Individual Therapy        Family Therapy        Other (Specify)          Patient had one absence during this period but did not answer his phone when called and did not report the reason for his absence.    Weekly Treatment Plan Review     Treatment Plan initiated on: 21.    Dimension1: Acute Intoxication/Withdrawal Potential -   Previous Dimension Ratin  Current Dimension Ratin  Date of Last Use 21  Any reports of withdrawal symptoms - No    Dimension 2: Biomedical Conditions & Complications -   Previous Dimension Ratin  Current Dimension Ratin  Medical Concerns:  None  Current Medications & Medication Changes:  No current outpatient medications on file.     No current facility-administered medications for this visit.     Medication Prescriber:  N/A  Taking meds as prescribed? No, Nonne Prescribed'  Medication side effects or concerns:  N/A  Outside medical appointments this week (list provider and reason for visit):  None    Dimension 3: Emotional/Behavioral Conditions & Complications -   Previous Dimension Ratin  Current Dimension Ratin  PHQ9   No flowsheet data found.  GAD7   No flowsheet data found.  Mental health diagnosis None  Date of last SIB:  ---  Date of  last SI:  ---  Date of last HI: ---  Behavioral Targets:  N/A  Current MH Assignments:  N/A    Narrative:  N/A    Dimension 4: Treatment Acceptance / Resistance -   Previous Dimension Ratin  Current Dimension Ratin  CLEMENTE Diagnosis:  Stimulant Use Disorder:   , Specify current severity:  Moderate  304.40 (F15.20) Moderate, Amphetamine type substance  Stage - Preparation/Action  Commitment to tx process/Stage of change- Tentative  CLEMENTE  assignments - will increase motivation towards recovery by participating in outpatient programming    Narrative - Client attended 1/2 groups via tele-health application this week and participated in group discussion with prompting. Client stated during check in that he is sober because he does not want to break the law anymore and that he was grateful to be able to attend group.    Dimension 5: Relapse / Continued Problem Potential -   Previous Dimension Rating:  3  Current Dimension Rating:  3  Relapses this week - None  Urges to use - None  UA results - No results found for this or any previous visit (from the past 168 hour(s)).    Narrative- Client denied any use during check-in this week.    Dimension 6: Recovery Environment -   Previous Dimension Rating:  3  Current Dimension Rating:  3  Family Involvement -   Summarize attendance at family groups and family sessions - N/A  Family supportive of treatment?  Yes    Community support group attendance - None  Recreational activities - None this week.    Narrative - Client [facility]. The [facility] Community Support Group is not currently being held and there are no other resources for recovery support in a language the client can understand.    Justification for Continued Treatment at this Level of Care:    The client reports sustained abstinence with no withdrawal concerns and can manage their biomedical concerns.   The client is currently compliant with group expectations.   The client is also continuing to work on the development of coping skills as well as implementation.   The client remains on probation and is working towards getting their  s license back.   The client continues to benefit from the support group therapy offers.    Discharge Planning:  Target Discharge Date/Timeframe: 12/08/21   Med Mgmt Provider/Appt:  N/A   Ind therapy Provider/Appt:  N/A   Family therapy Provider/Appt:   N/A   Other referrals:  None    Has vulnerable adult status change? No  Service Coordination:  None  Supervision:  Client met briefly with Bilingual  Stu Hansen this week.    Are Treatment Plan goals/objectives effective? Yes  *If no, list changes to treatment plan:    Are the current goals meeting client's needs? Yes  *If no, list the changes to treatment plan.    Client Input / Response: Agreeable    *Client agrees with any changes to the treatment plan: N/A  *Client received copy of changes: N/A  *Client is aware of right to access a treatment plan review: Yes

## 2021-08-24 ENCOUNTER — DOCUMENTATION ONLY (OUTPATIENT)
Dept: ADDICTION MEDICINE | Facility: HOSPITAL | Age: 31
End: 2021-08-24

## 2021-08-24 NOTE — PROGRESS NOTES
[facility]      8/24/2021      Jayesh Mcconnell  [address]  [place] [address]    Dear Jayesh,    This is to advise you that you have been discharged from the program effective immediately due to excessive absences after repeated warnings. Please follow the recommendations below.    The following recommendations are being made:    Complete a comprehensive assessment and follow all recommendations.   (Call [phone] to schedule comprehensive assessment through [place].)  Abstain from all mood-altering chemicals unless prescribed by a licensed provider.   Attend weekly sober support group meetings.     Remain law abiding.  Follow and complete all requirements of your parole and district court.    If we can be of further service, please don't hesitate to call.    Sincerely,    Kaden Myles [facility]  Phone: [phone]    Stu Hansen, Bilingual   Phone [phone]

## 2021-08-25 ENCOUNTER — TELEPHONE (OUTPATIENT)
Dept: ADDICTION MEDICINE | Facility: HOSPITAL | Age: 31
End: 2021-08-25

## 2021-08-25 ENCOUNTER — DOCUMENTATION ONLY (OUTPATIENT)
Dept: ADDICTION MEDICINE | Facility: HOSPITAL | Age: 31
End: 2021-08-25

## 2021-08-25 NOTE — TELEPHONE ENCOUNTER
This writer help a client call  to get  fax number for sending a client discharge letter and discharge summary. No answer just leave a voice mail to call back.     Stu Hansen, Bilingual Community Health Liaison  8/25/2021  3:05 PM

## 2021-08-25 NOTE — PROGRESS NOTES
Substance Use Disorder Discharge Summary       Name:  Jayesh Mcconnell   :  JOSE Patel   Admit Date: 21   Discharge Date: 21   :  1990   Hours Completed: 42   Initial Diagnosis:  304.40 (F15.20) Amphetamine Use Disorder Moderate   Final Diagnosis:  304.40 (F15.20) Amphetamine Use Disorder Moderate   Discharge Address:    [address]  [place] [address]   Funding Source:    [facility]     Program: [facility]     Discharge Type: AWOL    Patient was receiving residential services at the time of discharge: NO    Reasons for and circumstances of service termination:  Jayesh Mcconnell  is a 31 y.o. year-old male who admitted to [facility] on 21 and has completed 42 hours as of 21. Client is being discharged AWOL after excessive unexplained group absences, two group extensions and continued absences after warnings.  Client received Basic Education on group rules and norms and treatment orientation, and information on the following topics; Feelings in Addiction, Gratitude, Healthy Life [place], What is Stress?, Stress Management, Depression and Suicide Prevention, Setting a Personal Goal, What is Addiction?, The Cycle of Addiction, Describe Your Use Behavior, Benefits and Consequences of Drinking and Using, Reviewing Our Life Experiences, Understanding Cravings and Withdrawal, What is Recovery?, The Power of Positive Thinking, Thinking Comes Before Drinking, Recognizing Stress and Handling Stress, How Stress is Related to Addiction, Anger Management, Levels of Treatment, Goals and Recovery, DWI Laws and Penalties, Recovery Resources and Understanding Chemical Health.      If program discharge status was At Staff Request, the license beck must identify the following:     Other interested parties conferred with: N/A  Referrals provided: N/A  Alternatives considered and attempted before deciding to discharge: N/A     Dimension/Course of Treatment/Individualized  Care:   1. Withdrawal Potential - Intake Risk level - 0 Discharge Risk level - UNABLE TO ASSESS  Narrative supporting risk description:  No signs of intoxication or withdrawal noted or reported.   Treatment plan goals and progress towards those goals:  Goal: On admission, the Client s reported last date of use was 04/27/2021. Client appeared to maintain abstinence during treatment, while attending groups. Client submitted a positive UA for methamphetamine on 5/14/21, but submitted samples that were negative on 5/24/21, 6/21/21 and 6/30/21.   2. Biomedical Conditions and Complications - Intake Risk level - 0 Discharge Risk level - UNABLE TO ASSESS  Narrative supporting risk description:  The client reported he had no medical needs unmet and was able to obtain any necessary care on his own.   Treatment plan goals and progress towards those goals:  Goal: Patient to maintain stable health throughout outpatient treatment.   No Changes noted during treatment.     3. Emotional/Behavioral/Cognitive Conditions and Complications - Intake Risk level - 0 Discharge Risk level - UNABLE TO ASSESS  Narrative supporting risk description:  On admission, no mental health or emotional problems were noted or reported. Client is a [place] from [place], who came to the [place] in [place], speaks limited English and is still in the process of acculturating.  Treatment plan goals and progress towards those goals:  Goal: Maintain Stability of Mental Health.  Client participated in groups sporadically throughout treatment and had been extended in phase one twice, with a warning that any further absences would result in discharge. Client missed 4 more groups after the second extension.   4. Readiness for Change - Intake Risk level - 1 Discharge Risk level - UNABLE TO ASSESS  Narrative supporting risk description:  On admission, the client did not recognize that his substance use could be causing problems in his life but stated he was  willing to attend treatment and follow recommendations. Client had external motivation for attending treatment in the form of probation for DWI. Client provided information during his assessment interview that was markedly different from information obtained from collateral sources. The Client appeared to have limited insight into his drinking problems due in part to language and cultural barriers that interfered with his understanding of the need for treatment.  Treatment plan goals and progress towards those goals:  Goal: Patient to increase motivation towards recovery by participating in outpatient programming.   Client participated in groups in a limited fashion, attending sporadically and participating in a limited fashion and usually only with direct prompting. Client s discharge date was extended twice in phase one due to missing groups and client then missed 4 additional groups.   5. Relapse/Continued Use/Continued Problem Potential - Intake Risk level - 3 Discharge Risk level - UNABLE TO ASSESS  Narrative supporting risk description:  Client had identified no significant periods of abstinence from mood-altering substances at time of admission. On admission the client did not demonstrate any awareness of situations or emotions that triggered his substance use or display any knowledge or understanding of the skills or coping mechanisms necessary to avoid those triggers. The Client lacked awareness and understanding of western concepts regarding substance use disorders due to language and cultural barriers.  Treatment plan goals and progress towards those goals:  Goal: Client will gain insight into situations and emotions that trigger his drinking.  Client participated in groups attending inconsistently and consistently denied any use until directly presented with his positive UA results and warned by his  that use would have consequences.   6.  Recovery Environment - Intake Risk level -  3  Discharge Risk level - UNABLE TO ASSESS  Narrative supporting risk description:  On admission, the client reported he was not employed and was living with his sister and her children. Client reported his family was supportive of his treatment. The client was not involved in any structured educational or volunteer activities and was not participating in any community-based recovery support groups. Client was on parole for a previous offense involving substance possession and had a recent arrest for possession of Methamphetamine.  Treatment plan goals and progress towards those goals:  Goal: Client will explore and identify healthy sober supports in his community.  Client did not report any attendance at community support groups while in treatment. Client reported that he was arrested on two occasions during treatment and at discharge had two cases pending in district court and remained under supervised parole.   Strengths: Client identified strengths were limited to  I like to work .  Needs: ESL Classes; Ongoing Recovery Support  Services Provided: Intake; assessment; treatment planning; Psychoeducation; and recommendations at discharge.      Program Involvement:                Poor  Attendance:                                Poor  Ability to relate in group/               Other program activities:            Poor  Assignment Completion:             Poor  Overall Behavior:                        Poor  Reported Family/Significant          Other Involvement:                     N/A     Prognosis:                                   Poor    Focus of Treatment / Discharge Recommendations    Client is recommended to abstain from the use of any mood-altering substances, remain law abiding, obtain and follow the recommendations of a new chemical health assessment, Attend Community Based Sobriety/Recovery Support Groups and Identify and Maintain a Social Network of Sober Friends.    Personal Safety/ Management of Symptoms    *  Follow your safety plan.  Report increased symptoms to your care team and /or go to the nearest Emergency Department.    * Call crisis lines as needed    [facility] [phone]                [facility] [phone]  [facility] [phone]              [facility]risis [facility] [phone]  [facility] [phone]              [facility] [phone]  [facility] [phone]          National Suicide Prevention [phone]  [facility] [phone]            Suicide Prevention [phone]  [facility] [phone]    Abstinence/Relapse Prevention  * Take all medicines as directed.  Carry a current list of medicines with you.  * Use coping skills: ---  * Do not use illicit (street) drugs, controlled substances (narcotics) or alcohol.    Develop/Improve Independent Living/Socialization Skills: ---    Community Resources/Supports and Discharge Planning:  ---  Follow up with psychiatrist / main caregiver: N/A    Next visit: N/A    Follow up with your therapist: N/A   Next visit: N/A    Go to group therapy and / or support groups at: N/A    See your medical doctor about:  Any Concerns.    Other:  N/A    Counselor Name and Title:  JOSE Patel     Date:  8/25/2021 Time:  11:27 AM

## 2021-08-26 ENCOUNTER — DOCUMENTATION ONLY (OUTPATIENT)
Dept: ADDICTION MEDICINE | Facility: HOSPITAL | Age: 31
End: 2021-08-26

## 2021-08-26 NOTE — PROGRESS NOTES
This writer mailed out discharge letter and Substance Use Disorder discharge summary to a client.     Stu Hansen, Bilingual Community Health Liaison  8/26/2021  3:04 PM

## 2022-05-04 ENCOUNTER — DOCUMENTATION ONLY (OUTPATIENT)
Dept: ADDICTION MEDICINE | Facility: HOSPITAL | Age: 32
End: 2022-05-04

## 2022-05-04 NOTE — PROGRESS NOTES
Jayesh dejesus came to [facility] on 5/4/2022 and signed release for his , sister, and general consent  Form.Jayesh Dejesus said that he got 2 Citation and he need to do chemical dependency evaluation/treatment. He also need to follow all instructions of probation .   NO health insurance. This writer referral him to [facility] for apply health insurance.      Stu Hansen - Jef Fontanez.   5/4/2022;  12:05 PM